# Patient Record
Sex: MALE | Race: BLACK OR AFRICAN AMERICAN | Employment: UNEMPLOYED | URBAN - METROPOLITAN AREA
[De-identification: names, ages, dates, MRNs, and addresses within clinical notes are randomized per-mention and may not be internally consistent; named-entity substitution may affect disease eponyms.]

---

## 2019-01-01 ENCOUNTER — ED HISTORICAL/CONVERTED ENCOUNTER (OUTPATIENT)
Dept: OTHER | Age: 24
End: 2019-01-01

## 2019-01-29 ENCOUNTER — ED HISTORICAL/CONVERTED ENCOUNTER (OUTPATIENT)
Dept: OTHER | Age: 24
End: 2019-01-29

## 2019-07-10 ENCOUNTER — ED HISTORICAL/CONVERTED ENCOUNTER (OUTPATIENT)
Dept: OTHER | Age: 24
End: 2019-07-10

## 2021-02-22 ENCOUNTER — HOSPITAL ENCOUNTER (INPATIENT)
Age: 26
LOS: 12 days | Discharge: HOME | DRG: 760 | End: 2021-03-06
Attending: EMERGENCY MEDICINE | Admitting: PSYCHIATRY & NEUROLOGY
Payer: COMMERCIAL

## 2021-02-22 DIAGNOSIS — E87.6 HYPOKALEMIA: ICD-10-CM

## 2021-02-22 DIAGNOSIS — F12.90 MARIJUANA USE: ICD-10-CM

## 2021-02-22 DIAGNOSIS — F29 PSYCHOSIS, UNSPECIFIED PSYCHOSIS TYPE (HCC): Primary | ICD-10-CM

## 2021-02-22 PROBLEM — F30.9 MANIA (HCC): Status: ACTIVE | Noted: 2021-02-22

## 2021-02-22 LAB
ALBUMIN SERPL-MCNC: 4.3 G/DL (ref 3.5–5)
ALBUMIN/GLOB SERPL: 1.1 {RATIO} (ref 1.1–2.2)
ALP SERPL-CCNC: 59 U/L (ref 45–117)
ALT SERPL-CCNC: 33 U/L (ref 12–78)
AMPHET UR QL SCN: NEGATIVE
ANION GAP SERPL CALC-SCNC: 7 MMOL/L (ref 5–15)
APPEARANCE UR: CLEAR
AST SERPL W P-5'-P-CCNC: 17 U/L (ref 15–37)
BACTERIA URNS QL MICRO: NEGATIVE /HPF
BARBITURATES UR QL SCN: NEGATIVE
BASOPHILS # BLD: 0 K/UL (ref 0–0.1)
BASOPHILS NFR BLD: 0 % (ref 0–1)
BENZODIAZ UR QL: NEGATIVE
BILIRUB SERPL-MCNC: 0.7 MG/DL (ref 0.2–1)
BILIRUB UR QL: NEGATIVE
BUN SERPL-MCNC: 9 MG/DL (ref 6–20)
BUN/CREAT SERPL: 7 (ref 12–20)
CA-I BLD-MCNC: 8.8 MG/DL (ref 8.5–10.1)
CANNABINOIDS UR QL SCN: POSITIVE
CHLORIDE SERPL-SCNC: 106 MMOL/L (ref 97–108)
CO2 SERPL-SCNC: 25 MMOL/L (ref 21–32)
COCAINE UR QL SCN: NEGATIVE
COLOR UR: ABNORMAL
CREAT SERPL-MCNC: 1.27 MG/DL (ref 0.7–1.3)
DIFFERENTIAL METHOD BLD: NORMAL
DRUG SCRN COMMENT,DRGCM: ABNORMAL
EOSINOPHIL # BLD: 0.2 K/UL (ref 0–0.4)
EOSINOPHIL NFR BLD: 2 % (ref 0–7)
ERYTHROCYTE [DISTWIDTH] IN BLOOD BY AUTOMATED COUNT: 13.4 % (ref 11.5–14.5)
ETHANOL SERPL-MCNC: <4 MG/DL
GLOBULIN SER CALC-MCNC: 3.9 G/DL (ref 2–4)
GLUCOSE BLD STRIP.AUTO-MCNC: 86 MG/DL (ref 65–100)
GLUCOSE SERPL-MCNC: 182 MG/DL (ref 65–100)
GLUCOSE UR STRIP.AUTO-MCNC: NEGATIVE MG/DL
HCT VFR BLD AUTO: 43.9 % (ref 36.6–50.3)
HGB BLD-MCNC: 14.8 G/DL (ref 12.1–17)
HGB UR QL STRIP: NEGATIVE
IMM GRANULOCYTES # BLD AUTO: 0 K/UL (ref 0–0.04)
IMM GRANULOCYTES NFR BLD AUTO: 0 % (ref 0–0.5)
KETONES UR QL STRIP.AUTO: NEGATIVE MG/DL
LEUKOCYTE ESTERASE UR QL STRIP.AUTO: NEGATIVE
LYMPHOCYTES # BLD: 2.2 K/UL (ref 0.8–3.5)
LYMPHOCYTES NFR BLD: 24 % (ref 12–49)
MCH RBC QN AUTO: 29.1 PG (ref 26–34)
MCHC RBC AUTO-ENTMCNC: 33.7 G/DL (ref 30–36.5)
MCV RBC AUTO: 86.4 FL (ref 80–99)
METHADONE UR QL: NEGATIVE
MONOCYTES # BLD: 0.7 K/UL (ref 0–1)
MONOCYTES NFR BLD: 8 % (ref 5–13)
MUCOUS THREADS URNS QL MICRO: ABNORMAL /LPF
NEUTS SEG # BLD: 6.2 K/UL (ref 1.8–8)
NEUTS SEG NFR BLD: 66 % (ref 32–75)
NITRITE UR QL STRIP.AUTO: NEGATIVE
OPIATES UR QL: NEGATIVE
PCP UR QL: NEGATIVE
PERFORMED BY, TECHID: NORMAL
PH UR STRIP: 5 [PH] (ref 5–8)
PLATELET # BLD AUTO: 196 K/UL (ref 150–400)
PMV BLD AUTO: 11.7 FL (ref 8.9–12.9)
POTASSIUM SERPL-SCNC: 2.7 MMOL/L (ref 3.5–5.1)
PROT SERPL-MCNC: 8.2 G/DL (ref 6.4–8.2)
PROT UR STRIP-MCNC: 30 MG/DL
RBC # BLD AUTO: 5.08 M/UL (ref 4.1–5.7)
RBC #/AREA URNS HPF: ABNORMAL /HPF (ref 0–5)
SARS-COV-2, COV2: NORMAL
SARS-COV-2, COV2: NOT DETECTED
SODIUM SERPL-SCNC: 138 MMOL/L (ref 136–145)
SP GR UR REFRACTOMETRY: 1.02 (ref 1–1.03)
UROBILINOGEN UR QL STRIP.AUTO: 0.1 EU/DL (ref 0.1–1)
WBC # BLD AUTO: 9.3 K/UL (ref 4.1–11.1)
WBC URNS QL MICRO: ABNORMAL /HPF (ref 0–4)

## 2021-02-22 PROCEDURE — 36415 COLL VENOUS BLD VENIPUNCTURE: CPT

## 2021-02-22 PROCEDURE — 82962 GLUCOSE BLOOD TEST: CPT

## 2021-02-22 PROCEDURE — 74011250637 HC RX REV CODE- 250/637: Performed by: EMERGENCY MEDICINE

## 2021-02-22 PROCEDURE — 82077 ASSAY SPEC XCP UR&BREATH IA: CPT

## 2021-02-22 PROCEDURE — 65220000003 HC RM SEMIPRIVATE PSYCH

## 2021-02-22 PROCEDURE — 96372 THER/PROPH/DIAG INJ SC/IM: CPT

## 2021-02-22 PROCEDURE — 99285 EMERGENCY DEPT VISIT HI MDM: CPT

## 2021-02-22 PROCEDURE — 80307 DRUG TEST PRSMV CHEM ANLYZR: CPT

## 2021-02-22 PROCEDURE — 80053 COMPREHEN METABOLIC PANEL: CPT

## 2021-02-22 PROCEDURE — 81001 URINALYSIS AUTO W/SCOPE: CPT

## 2021-02-22 PROCEDURE — 85025 COMPLETE CBC W/AUTO DIFF WBC: CPT

## 2021-02-22 PROCEDURE — 87635 SARS-COV-2 COVID-19 AMP PRB: CPT

## 2021-02-22 PROCEDURE — 74011250636 HC RX REV CODE- 250/636: Performed by: EMERGENCY MEDICINE

## 2021-02-22 RX ORDER — TRAZODONE HYDROCHLORIDE 50 MG/1
50 TABLET ORAL
Status: DISCONTINUED | OUTPATIENT
Start: 2021-02-22 | End: 2021-03-06 | Stop reason: HOSPADM

## 2021-02-22 RX ORDER — HYDROXYZINE PAMOATE 50 MG/1
50 CAPSULE ORAL
Status: DISCONTINUED | OUTPATIENT
Start: 2021-02-22 | End: 2021-03-06 | Stop reason: HOSPADM

## 2021-02-22 RX ORDER — ENOXAPARIN SODIUM 100 MG/ML
30 INJECTION SUBCUTANEOUS EVERY 12 HOURS
Status: DISCONTINUED | OUTPATIENT
Start: 2021-02-22 | End: 2021-02-26

## 2021-02-22 RX ORDER — ACETAMINOPHEN 650 MG/1
650 SUPPOSITORY RECTAL
Status: DISCONTINUED | OUTPATIENT
Start: 2021-02-22 | End: 2021-03-06 | Stop reason: HOSPADM

## 2021-02-22 RX ORDER — ACETAMINOPHEN 325 MG/1
650 TABLET ORAL
Status: DISCONTINUED | OUTPATIENT
Start: 2021-02-22 | End: 2021-03-06 | Stop reason: HOSPADM

## 2021-02-22 RX ORDER — ZIPRASIDONE MESYLATE 20 MG/ML
INJECTION, POWDER, LYOPHILIZED, FOR SOLUTION INTRAMUSCULAR
Status: DISPENSED
Start: 2021-02-22 | End: 2021-02-22

## 2021-02-22 RX ORDER — ZIPRASIDONE MESYLATE 20 MG/ML
20 INJECTION, POWDER, LYOPHILIZED, FOR SOLUTION INTRAMUSCULAR
Status: ACTIVE | OUTPATIENT
Start: 2021-02-22 | End: 2021-02-22

## 2021-02-22 RX ORDER — QUETIAPINE FUMARATE 100 MG/1
100 TABLET, FILM COATED ORAL 3 TIMES DAILY
Status: DISCONTINUED | OUTPATIENT
Start: 2021-02-22 | End: 2021-03-01

## 2021-02-22 RX ORDER — POTASSIUM CHLORIDE 750 MG/1
40 TABLET, FILM COATED, EXTENDED RELEASE ORAL
Status: COMPLETED | OUTPATIENT
Start: 2021-02-22 | End: 2021-02-22

## 2021-02-22 RX ADMIN — POTASSIUM CHLORIDE 40 MEQ: 750 TABLET, FILM COATED, EXTENDED RELEASE ORAL at 04:01

## 2021-02-22 RX ADMIN — ENOXAPARIN SODIUM 30 MG: 100 INJECTION SUBCUTANEOUS at 06:33
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
## 2021-02-22 NOTE — BSMART NOTE
Pt accepted to 2S by Manny Mcclure pending available acute bed. Pt to be transferred once bed is ready.

## 2021-02-22 NOTE — ROUTINE PROCESS
TRANSFER - OUT REPORT: 
 
Verbal report given to Jacqui(name) on Arian Calderon  being transferred to (unit) for routine progression of care Report consisted of patients Situation, Background, Assessment and  
Recommendations(SBAR). Information from the following report(s) SBAR was reviewed with the receiving nurse. Lines:    
 
Opportunity for questions and clarification was provided. Patient transported with: 
 Pricila Aquino
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
## 2021-02-23 PROCEDURE — 65220000003 HC RM SEMIPRIVATE PSYCH

## 2021-02-23 RX ORDER — OLANZAPINE 5 MG/1
5 TABLET ORAL
Status: DISCONTINUED | OUTPATIENT
Start: 2021-02-23 | End: 2021-03-06 | Stop reason: HOSPADM

## 2021-02-23 RX ORDER — HALOPERIDOL 5 MG/ML
5 INJECTION INTRAMUSCULAR
Status: DISCONTINUED | OUTPATIENT
Start: 2021-02-23 | End: 2021-03-06 | Stop reason: HOSPADM

## 2021-02-23 RX ORDER — ADHESIVE BANDAGE
30 BANDAGE TOPICAL DAILY PRN
Status: DISCONTINUED | OUTPATIENT
Start: 2021-02-23 | End: 2021-03-06 | Stop reason: HOSPADM

## 2021-02-23 RX ORDER — TRAZODONE HYDROCHLORIDE 50 MG/1
50 TABLET ORAL
Status: DISCONTINUED | OUTPATIENT
Start: 2021-02-23 | End: 2021-03-06 | Stop reason: HOSPADM

## 2021-02-23 RX ORDER — HYDROXYZINE 50 MG/1
50 TABLET, FILM COATED ORAL
Status: DISCONTINUED | OUTPATIENT
Start: 2021-02-23 | End: 2021-03-06 | Stop reason: HOSPADM

## 2021-02-23 RX ORDER — LORAZEPAM 2 MG/ML
1 INJECTION INTRAMUSCULAR
Status: DISCONTINUED | OUTPATIENT
Start: 2021-02-23 | End: 2021-03-06 | Stop reason: HOSPADM

## 2021-02-23 RX ORDER — ACETAMINOPHEN 325 MG/1
650 TABLET ORAL
Status: DISCONTINUED | OUTPATIENT
Start: 2021-02-23 | End: 2021-03-06 | Stop reason: HOSPADM

## 2021-02-23 RX ORDER — BENZTROPINE MESYLATE 1 MG/1
1 TABLET ORAL
Status: DISCONTINUED | OUTPATIENT
Start: 2021-02-23 | End: 2021-03-06 | Stop reason: HOSPADM

## 2021-02-23 RX ORDER — DIPHENHYDRAMINE HYDROCHLORIDE 50 MG/ML
50 INJECTION, SOLUTION INTRAMUSCULAR; INTRAVENOUS
Status: DISCONTINUED | OUTPATIENT
Start: 2021-02-23 | End: 2021-03-06 | Stop reason: HOSPADM

## 2021-02-23 RX ORDER — POTASSIUM CHLORIDE 20 MEQ/1
40 TABLET, EXTENDED RELEASE ORAL
Status: DISPENSED | OUTPATIENT
Start: 2021-02-23 | End: 2021-02-24

## 2021-02-23 NOTE — GROUP NOTE
[facility] GROUP DOCUMENTATION INDIVIDUAL Group Therapy Note Date: 2/23/2021 Group Start Time: 0900 Group End Time: 0930 Group Topic: [employer] SRM 2 [facility] ACUTE Chris Santillan RN 
 
 
 
Group Therapy Note CLIENT INVITED TO  ATTEND BUT DECLINED Attend 3 Attendance: {[facility] GROUP DOC ATTENDANCE:08845} Patient's Goal:  *** Interventions/techniques: {[facility] GROUP DOC INTERVENTIONS/TECHNIQUES:18209} Follows Directions: {[facility] GROUP DOC FOLLOWS DIRECTION:45751} Interactions: {[facility] GROUP DOC INTERACTIONS:86583} Mental Status: {[facility] MENTAL FNZNID:34971} Behavior/appearance: {[facility] GROUP DOC BEHAVIOR/APPEARANCE:14167} Goals Achieved: {[facility] GROUP DOC GOALS ACHIEVED:34593} Additional Notes:  *** 
 
Samantha Lawler RN

## 2021-02-23 NOTE — GROUP NOTE
IP  GROUP DOCUMENTATION INDIVIDUAL Group Therapy Note Date: 2/23/2021 Group Start Time: 4181 Group End Time: 5796 Group Topic: Special Track - Drug Topic SRM 2 [facility] ACUTE Maria E Ohs, RT 
 
[facility] GROUP DOCUMENTATION GROUP Group Therapy Note [employer] Attendees: 4 Attendance: Did not attend Additional Notes:  Pt was invited to attend group but did not attend.  
 
Adalberto Gomez, RT

## 2021-02-23 NOTE — GROUP NOTE
[facility] GROUP DOCUMENTATION INDIVIDUAL Group Therapy Note Date: 2/23/2021 Group Start Time: 1300 Group End Time: 0644 Group Topic: Process Group - Inpatient SRM CARE MANAGEMENT Chan Chavez [facility] GROUP DOCUMENTATION GROUP Group Therapy Note Attendees: 4/10 Pts participated in group therapy. Pts answered check in question about what led to admission and how they are feeling today. Pt's then discussed substance use, support system and other mental health related topics. Pts were encouraged to share and to give feedback to peers. Attendance: Did not attend [facility]

## 2021-02-23 NOTE — BH NOTES
RELEASE OF INFORMATION    The patient signed a consent form for his mother Guillermo Max ([phone]), and his father Otoniel Estrella ([phone]) to be contacted.

## 2021-02-23 NOTE — BH NOTES
Patient is assessed being in bed all day. Patient was encouraged several times by staff to get out of bed and participate in treatment. Patient refused breakfast and lunch but he did get out of bed for dinner. Patient refused all medications from this writer. Patient stated that the ED lied on him. Patient was provided support. Patient has a flat affect, and poor eye contact. Patient was not noted to have good hygiene throughout the day.

## 2021-02-23 NOTE — PROGRESS NOTES
Problem: Klaudia/Hypomania  Goal: *LTG: Reduce agitation, impulsivity, and pressured speech  Description: Reduce agitation, impulsivity, and pressured speech while achieving sensitivity to the consequences of behavior and having more realistic expectations. Outcome: Progressing Towards Goal     Problem: Klaudia/Hypomania  Goal: *STG: Be less agitated and distracted  Description: Be less agitated and distracted, e.g. be able to sit quietly and calmly for 60 minutes  Outcome: Progressing Towards Goal     Problem: Chemical Dependency (Adult/Pediatric)  Goal: *STG: Complies with medication therapy  Outcome: Not Progressing Towards Goal     Problem: Chemical Dependency (Adult/Pediatric)  Goal: *STG: Attends activities and groups  Outcome: Not Progressing Towards Goal     No agitation, impulsivity exhibited this shift. No agitation or distraction noted this shift. Patient declined medication. No groups this shift. Patient has been in bed all shift. Patient was engaged in a therapeutic conversation and he minimally responded. He was polite. Patient declined any needs. He declined to take his HS Seroquel. He declined snack. Patient has not exhibited any negative behaviors this shift. Continue to assess.

## 2021-02-23 NOTE — GROUP NOTE
IP  GROUP DOCUMENTATION INDIVIDUAL Group Therapy Note Date: 2/23/2021 Group Start Time: 1400 Group End Time: 1500 Group Topic: Special Track - Drug Topic SRM 2 [facility]UTE Noel Homans, RT 
 
[facility] GROUP DOCUMENTATION GROUP Group Therapy Note Stages of Change Attendees: 6 Attendance: Did not attend Additional Notes:  Pt was invited to attend group but did not Gearld Aida RT
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
## 2021-02-23 NOTE — BH NOTES
Recreational Therapy Assessment    Pt was approached to complete RT assessment but pt did not respond verbally when name was called a few times. Pt was laying in bed with covers pulled over his head.  Will re-attempt to assess at a later time
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
## 2021-02-23 NOTE — BH NOTES
Patient Unable to Complete Assessment Refused Assessment. Pt approached to complete RT Assessment but related he did not want to complete today. Encouraged to complete tomorrow.

## 2021-02-24 PROCEDURE — 65220000003 HC RM SEMIPRIVATE PSYCH

## 2021-02-24 PROCEDURE — 74011250636 HC RX REV CODE- 250/636: Performed by: PSYCHIATRY & NEUROLOGY

## 2021-02-24 PROCEDURE — 74011250637 HC RX REV CODE- 250/637: Performed by: PSYCHIATRY & NEUROLOGY

## 2021-02-24 PROCEDURE — 74011000250 HC RX REV CODE- 250: Performed by: PSYCHIATRY & NEUROLOGY

## 2021-02-24 RX ORDER — HALOPERIDOL 5 MG/ML
5 INJECTION INTRAMUSCULAR 2 TIMES DAILY
Status: DISCONTINUED | OUTPATIENT
Start: 2021-02-24 | End: 2021-03-01

## 2021-02-24 RX ADMIN — QUETIAPINE FUMARATE 100 MG: 100 TABLET ORAL at 16:53

## 2021-02-24 RX ADMIN — ZIPRASIDONE MESYLATE 20 MG: 20 INJECTION, POWDER, LYOPHILIZED, FOR SOLUTION INTRAMUSCULAR at 02:55

## 2021-02-24 RX ADMIN — QUETIAPINE FUMARATE 100 MG: 100 TABLET ORAL at 21:06

## 2021-02-24 NOTE — PROGRESS NOTES
Problem: Klaudia/Hypomania  Goal: *LTG: Reduce psychic energy and return to normal activity levels, good judgement, stable mood, and goal-directed behavior  Outcome: Not Progressing Towards Goal     Problem: Klaudia/Hypomania  Goal: *STG: Decrease impulsivity in action  Description: Decrease impulsivity in action, e.g. refrain from engaging in self-destructive behaviors such as over-spending, promiscuity, substance abuse, or use of profane language. Outcome: Not Progressing Towards Goal     Problem: Klaudia/Hypomania  Goal: *STG: Achieve mood stability  Description: Achieve mood stability, e.g. slower to react with anger and less expansive or elevated. Outcome: Not Progressing Towards Goal     Problem: Chemical Dependency (Adult/Pediatric)  Goal: *STG: Remains safe in hospital  Outcome: Progressing Towards Goal     Problem: Chemical Dependency (Adult/Pediatric)  Goal: *STG: Attends activities and groups  Outcome: Not Progressing Towards Goal     Patient has been pacing on the unit. Patient has been anxiously pacing the unit.   Patient has been safe in the hospital.
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
## 2021-02-24 NOTE — GROUP NOTE
[facility] GROUP DOCUMENTATION INDIVIDUAL Group Therapy Note Date: 2/24/2021 Group Start Time: 1100 Group End Time: 1200 Group Topic: Education Group - Inpatient [facility] Julissa Davis R 
 
[facility] GROUP DOCUMENTATION GROUP Group Therapy Note Attendees: Patients completed safety planning worksheet. Patients encouraged to think about warning signs they may be going into a crisis, different coping strategies, people they can reach out to for a distraction, people they can call for help, and ways to increase their safety when at their homes. Patients encouraged to share openly and support each other in coming up with responses for the worksheet. Attendance: Did not attend Patient's Goal:  n/a Interventions/techniques: n/a Follows Directions: n/a Interactions: n/a Mental Status: n/a Behavior/appearance: n/a 
 
Goals Achieved: n/a Additional Notes:  Pt encouraged but sis not attend group. Veronica Carrington

## 2021-02-24 NOTE — GROUP NOTE
[facility] GROUP DOCUMENTATION INDIVIDUAL Group Therapy Note Date: 2/24/2021 Group Start Time: 1300 Group End Time: 1922 Group Topic: Process Group - Inpatient [facility] Group Jimenez [facility] GROUP DOCUMENTATION GROUP Group Therapy Note Attendees: 6/9 Process: Pts were asked what they want to achieve while at [facility]. Pts were encouraged to share what brought them to the hospital and spoke a lot about anger and impulsive decisions. Pts were encouraged to provide feedback to peers. Writer facilitated a breathing exercise that pts could participate in if they chose to do so. Pts were then asked to reflect on group Attendance: Did not attend Additional Notes:  Pt encouraged briefly for a few minutes and then left group. Pt said he is 'trying to get through day by day' [employer]

## 2021-02-24 NOTE — GROUP NOTE
[facility]  GROUP DOCUMENTATION INDIVIDUAL Group Therapy Note Date: 2/24/2021 Group Start Time: 4738 Group End Time: 4734 Group Topic: [employer] [facility] ACUTE [place] [facility]  GROUP DOCUMENTATION GROUP Group Therapy Note Attendees: 3 Attendance: Did not attend Patient's Goal: Interventions/techniques: Follows Directions:  
 
Interactions:  
 
Mental Status:  
 
Behavior/appearance:  
 
Goals Achieved: Additional Notes:  Patient was offered the opportunity to attend but did not. Devon Morley

## 2021-02-24 NOTE — GROUP NOTE
IP  GROUP DOCUMENTATION INDIVIDUAL Group Therapy Note Date: 2/24/2021 Group Start Time: 1400 Group End Time: 1500 Group Topic: Special Track - Drug Topic [facility]UTE Titus Bryan, RT 
 
[facility] GROUP DOCUMENTATION GROUP Group Therapy Note Recovery & Relapse Attendees: 6 Attendance: Attended Patient's Goal:  To attend groups Interventions/techniques: Informed Follows Directions: Followed directions Interactions: Interacted appropriately Mental Status: Calm Behavior/appearance: Attentive and Cooperative Goals Achieved: Able to engage in interactions, Able to listen to others, Able to reflect/comment on own behavior and Able to receive feedback Additional Notes:  Pt attended group for the first time and was engaged.  
 
Earl Cornelius, RT
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
## 2021-02-24 NOTE — GROUP NOTE
[facility]  GROUP DOCUMENTATION INDIVIDUAL Group Therapy Note Date: 2/24/2021 Group Start Time: 1949 Group End Time: 8437 Group Topic: Special Track - Drug Topic [facility] ACUTE Rodrigo Baumann, RT 
 
[facility] GROUP DOCUMENTATION GROUP Group Therapy Note Feelings Attendees: 6 Attendance: Attended Patient's Goal:  To attend groups Interventions/techniques: Informed Follows Directions: Followed directions Interactions: Interacted appropriately Mental Status: Calm Behavior/appearance: Attentive and Cooperative Goals Achieved: Able to listen to others Additional Notes:  Pt attended group and was engaged in discussion of group topic.  
 
Hanna Duncan, RT

## 2021-02-24 NOTE — BH NOTES
Pt.has been in bed most of shift resting quietly. affect is flat, appetite fair, appearance is disheveled, denies feeling suicidal,appears anxious,fearful at times. parents called frequently to check on pt. No agitation seen thus far,no concerns voiced. remains on close observation.

## 2021-02-24 NOTE — BH NOTES
TDO & RAMY    Pt was committed for 10 days on 2-24-21 by Radha Guerin. He was represented by [employer]. Pt was also given an order to treat. Documentation was placed in pt's chart.

## 2021-02-25 PROCEDURE — 74011250636 HC RX REV CODE- 250/636: Performed by: PSYCHIATRY & NEUROLOGY

## 2021-02-25 PROCEDURE — 65220000003 HC RM SEMIPRIVATE PSYCH

## 2021-02-25 PROCEDURE — 74011250637 HC RX REV CODE- 250/637: Performed by: PSYCHIATRY & NEUROLOGY

## 2021-02-25 RX ADMIN — QUETIAPINE FUMARATE 100 MG: 100 TABLET ORAL at 17:20

## 2021-02-25 RX ADMIN — QUETIAPINE FUMARATE 100 MG: 100 TABLET ORAL at 21:23

## 2021-02-25 RX ADMIN — QUETIAPINE FUMARATE 100 MG: 100 TABLET ORAL at 08:40

## 2021-02-25 RX ADMIN — HALOPERIDOL LACTATE 5 MG: 5 INJECTION, SOLUTION INTRAMUSCULAR at 08:41
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
## 2021-02-25 NOTE — BH NOTES
COLLATERAL CONTACT    The therapist spoke with the patient's mother on the phone. She was inquiring about how her son was doing, and the therapist provided her with an update. The therapist explained that he was committed up to 10 days and that he has an order to treat for his medications because he had been refusing them. She said that she was not sure of any history of mental health in the family other than the patient's cousin on his dad's side who has been hospitalized before. She said that when the patient was previously hospitalized he was diagnosed with substance induced psychosis, and wasn't given any medications. She believes that he started smoking weed in high school. She said that he has seen more depressed recently, due to isolating himself, and not taking care of his ADL's including hygiene. She said that he can become so paranoid and delusional that there is not talking to him. She also confirmed that his son Anya Schilling is safe, and has a good life. She said that him and the mother of his children broke up officially around 6 months to a year ago. She said that they had been off and on their entire relationship. She said that at his baseline he is happy, and likes to joke around. 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 She said that he has not been in 3 years though. She added that he requested to see the  at one point, although he never showed up which had upset him.

## 2021-02-25 NOTE — BH NOTES
Dx: Psychosis    Affect restricted. Remains suspicious/paranoid. Verbalized he is unable to take a shower, because \"things get on me. \" No interactions with peers; minimal with staff. Reluctant to take prescribed meds; cooperative with explanations/encouragement. Consumed 100% of bfkt, and returned to bed. Appearance disheveled; encouraged to shower and change his gowns. Encouraged to attend groups. Q 15 mins checks maintained, for safety. Remained in bed, the majority of the shift. Refused to attend groups, although encouraged. Did not get up for lunch. No attempts to self harm. Pt did not shower and/or change his gowns, although encouraged.

## 2021-02-25 NOTE — GROUP NOTE
[facility] GROUP DOCUMENTATION INDIVIDUAL Group Therapy Note Date: 2/25/2021 Group Start Time: 1300 Group End Time: 1400 Group Topic: Process Group - Inpatient [facility] Group [facility] [place] [facility] GROUP DOCUMENTATION GROUP Group Therapy Note Attendees: 3 out of 11 participants engaged in a process group that discussed the patients planning guide. The patients identified with their symptoms and signals that triggered stress in their life. Patients were able to define their problems from several perspectives to process their source of stress. Patients discussed and processed coping strategies they have tried to support their needs and acknowledged what worked during and what did not work during their problem-solving attempts. Lastly Patients reflected on their capability to deal with their problems and their motivation to change. Attendance: Did not attend Patient's Goal:  Patient did not attend group session. Interventions/techniques: N/A Follows Directions: N/A Interactions: N/A Mental Status:N/A Behavior/appearance: N/A Goals Achieved: N/A Additional Notes:  Patient was informed of group session and provided topic information that would be discussed during the session. Patient decline to participate however the patient was provided literature, worksheets and informed on the process of completion. Patient was encouraged to complete worksheets during leisure to increase coping skills and ability to process how to problem-solve to prevent risk of hospitalization. Paolo Wiggins

## 2021-02-25 NOTE — PROGRESS NOTES
Client presents alert and oriented to person and place, cooperative, calm and flat and will engage in conversation when approached by staff. Denies SI/HI/AVH with no delusional conversation content and does not appear to be responding to internal stimuli at this time. Did not accept snack. Accepted PO medication.
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
## 2021-02-25 NOTE — BH NOTES
Pt Initial Assessment    Writer met with pt on this date for  Peer assessment. Pt currently expressed substance use of marijuana. Pt expressed using marijuana daily but was not able to say how much he smoked each day. Pt expressed that he felt his marijuana use was not serious and that he could manage it on his own. Pt reported feeling depressed and stated, \"I don't belong here, I'm not like those other people. \" Pt expressed feeling fatigued, insomnia, and anxiety but denies feeling any other detox symptoms. Pt was informed about the SA groups and encouraged to attend. Pt appears to be in the pre contemplation stage of change.

## 2021-02-25 NOTE — BH NOTES
Behavioral Health Treatment Team Note     Patient goal(s) for today: \"To try to make it through to get discharged\"  Treatment team focus/goals: To continue medication management, group therapy, and gather collateral.     Progress note: Pt presented as lying in bed with the side of his face on his pillow. Pt presented with blunted affect and reported feeling \"okay\". Pt reported he did not feel drowsy but wanted to \"rest\". Pt denied SI/HI, AH/VH. Pt reported he has not tried to call any of his family members and has not decided if he will at this point. An inpatient level of care is still required in order to be provided medication management, group therapy, and discharge planning assistance. LOS:  3  Expected LOS: 5-7 days    Insurance info/prescription coverage:  Self-pay  Date of last family contact:  2/24/21  Family requesting physician contact today:  No  Discharge plan: The therapist will continue to explore options with the patient.    Guns in the home: No  Outpatient provider(s):  None currently    Participating treatment team members: Leonie Heredia LMSW, ALLY Huston Intern

## 2021-02-25 NOTE — GROUP NOTE
[facility]  GROUP DOCUMENTATION INDIVIDUAL Group Therapy Note Date: 2/25/2021 Group Start Time: 8353 Group End Time: 5200 Group Topic: [employer] SRM 2 BH NON ACUTE Marmaduke Merl [facility] GROUP DOCUMENTATION GROUP Group Therapy Note Attendees:5 Attendance: Did not attend Patient's Goal: Interventions/techniques: Follows Directions:  
 
Interactions:  
 
Mental Status:  
 
Behavior/appearance:  
 
Goals Achieved: Additional Notes:  Pt was encouraged to attend group but refused. [facility] [place]

## 2021-02-25 NOTE — BH NOTES
SRM Recreational Therapy Assessment    Orientation:  Person    Reason for Admission:  PT unable to complete assessment at this time. Assessment completed from the chart. PT chart indicated he was brought to ER by police under ECO. Pt father called police due to increased erratic behaviors. PT running around in the street wearing shorts and no shoes. Pt also has demonstrated on admission thought blocking, preoccupations, paranoia, Slow response and responding to AVH. PT using marijuana daily. Has had previous mental health treatment in the past.      Medical Precautions / Conditions:  did not identify    Impairments:     Vision:  Wears Glasses unable to identify      Wears Contacts unable to identify      Are they with Patient unable to identify     Hearing Aids did not identify     Utilization of Ambulatory Devices:  did not identify    Health Problems Preventing Participation in Activities:  Unable to Answer  How:  n/a  Leisure Interest Checklist:  did not identify    Does patient participate in leisure activities:  Unable to Answer    Setting:  Alone    Other Activities / Skills / Talents: n/a  Do emotions interfere with leisure activities / lifestyles:  Unable to Answer    When engaging in leisure activities, do you forget worries:  Unable to Answer    Do you belong to a Congregation:  Unable to Answer    Are you active in [employer] activities:  Unable to Answer    Typical Day:  Did not identify   Strengths:  Family Support, Housing and lives with father and mother and father are supportive    Limitations / Barriers:  Substance Abuse, Hallucinations, Paranoia and poor insight and judgement.     Treatment Modalities:  Stress Management, Coping Skills, Symptom Recognition, Healthy Thinking, Mood Management and Leisure Skills    Patient Educational  Needs:  Skills to recognize and challenge problematic thinking, Identify positive coping strategies and skills to manage symptoms or moods, Leisure education and Recognition of symptoms and signs    Focus of Treatment:  Introduce positive outlets for self expression and Introduce and encourage the exploration of alternative coping skills    Summary: PT unable to complete assessment at this time. Assessment completed from the chart. PT chart indicated he was brought to ER by police under ECO. Pt father called police due to increased erratic behaviors. PT running around in the street wearing shorts and no shoes. Pt also has demonstrated on admission thought blocking, preoccupations, paranoia, Slow response and responding to AVH. PT using marijuana daily. Has had previous mental health treatment in the past.

## 2021-02-25 NOTE — GROUP NOTE
IP  GROUP DOCUMENTATION INDIVIDUAL Group Therapy Note Date: 2/25/2021 Group Start Time: 6510 Group End Time: 4814 Group Topic: Special Track - Drug Topic SRM 2 BEHA HLTH ACUTE Skeet Parrott, RT 
 
[facility] GROUP DOCUMENTATION GROUP Group Therapy Note [facility] Attendees: 3 Attendance: Did not attend Additional Notes: Pt was invited to attend group but did not attend.  
 
Jose Luis Cat, RT

## 2021-02-25 NOTE — GROUP NOTE
IP  GROUP DOCUMENTATION INDIVIDUAL Group Therapy Note Date: 2/25/2021 Group Start Time: 1400 Group End Time: 1500 Group Topic: Special Track - Drug Topic SRM 2 BEHA HLTH ACUTE Manda Beach, RT 
 
[facility] GROUP DOCUMENTATION GROUP Group Therapy Note Protracted Withdrawal  
 
Attendees: 4 Attendance: Did not attend Additional Notes: Pt was invited to attend group but did not attend.  
 
Maritza Blanchard, RT

## 2021-02-25 NOTE — GROUP NOTE
[facility] GROUP DOCUMENTATION INDIVIDUAL Group Therapy Note Date: 2/25/2021 Group Start Time: 46 Group End Time: 1000 Group Topic: Nursing SRM 2 BEHA [facility] ACUTE Vanessa Hoang RN 
 
[facility] GROUP DOCUMENTATION GROUP Group Therapy Note Attendees: 5 Attendance: Did not attend Patient's Goal: Interventions/techniques: Follows Directions:  
 
Interactions:  
 
Mental Status:  
 
Behavior/appearance:  
 
Goals Achieved: Additional Notes:  Patient encouraged to attend the group, but patient refused and did not attend. Isolated to his room.   
 
Lee Cosby RN

## 2021-02-25 NOTE — PROGRESS NOTES
Problem: Klaudia/Hypomania  Goal: *LTG: Reduce agitation, impulsivity, and pressured speech  Description: Reduce agitation, impulsivity, and pressured speech while achieving sensitivity to the consequences of behavior and having more realistic expectations. Outcome: Progressing Towards Goal  Goal: *STG: Decrease impulsivity in action  Description: Decrease impulsivity in action, e.g. refrain from engaging in self-destructive behaviors such as over-spending, promiscuity, substance abuse, or use of profane language. Outcome: Progressing Towards Goal  Goal: *STG: Achieve mood stability  Description: Achieve mood stability, e.g. slower to react with anger and less expansive or elevated.   Outcome: Progressing Towards Goal     Problem: Chemical Dependency (Adult/Pediatric)  Goal: *STG: Remains safe in hospital  Outcome: Progressing Towards Goal  Goal: *STG: Complies with medication therapy  Outcome: Progressing Towards Goal

## 2021-02-25 NOTE — GROUP NOTE
[facility] GROUP DOCUMENTATION INDIVIDUAL Group Therapy Note Date: 2/25/2021 Group Start Time: 1100 Group End Time: 7798 Group Topic: Education Group - Inpatient [facility] Cathy Rodriguez [facility] GROUP DOCUMENTATION GROUP Group Therapy Note Attendees: 6/11 Psych-Ed: Pts were asked to share something that they are proud of themselves of as a check in question. Pts were then walked through the triggers and feelings worksheet and were encouraged to share their thoughts and provide feedback to peers. Pts were then walked through a breathing exercise before reflecting on group Attendance: Did not attend Additional Notes:  Pt was encouraged to attend but chose not to do so [facility]

## 2021-02-26 PROCEDURE — 65220000003 HC RM SEMIPRIVATE PSYCH

## 2021-02-26 PROCEDURE — 74011250636 HC RX REV CODE- 250/636: Performed by: PSYCHIATRY & NEUROLOGY

## 2021-02-26 PROCEDURE — 74011250637 HC RX REV CODE- 250/637: Performed by: PSYCHIATRY & NEUROLOGY

## 2021-02-26 RX ADMIN — HALOPERIDOL LACTATE 5 MG: 5 INJECTION, SOLUTION INTRAMUSCULAR at 09:06

## 2021-02-26 RX ADMIN — QUETIAPINE FUMARATE 100 MG: 100 TABLET ORAL at 09:05

## 2021-02-26 RX ADMIN — QUETIAPINE FUMARATE 100 MG: 100 TABLET ORAL at 17:43

## 2021-02-26 RX ADMIN — QUETIAPINE FUMARATE 100 MG: 100 TABLET ORAL at 21:09

## 2021-02-26 NOTE — GROUP NOTE
IP  GROUP DOCUMENTATION INDIVIDUAL Group Therapy Note Date: 2/26/2021 Group Start Time: 1400 Group End Time: 1500 Group Topic: Special Track - Drug Topic SRM 2 BEHA [facility] ACUTE Manda Beach, RT 
 
[facility] GROUP DOCUMENTATION GROUP Group Therapy Note Disease of Addiction Attendees: 5 Attendance: Did not attend Additional Notes:  Pt was invited to attend group but did not attend Maritza Blanchard RT

## 2021-02-26 NOTE — BH NOTES
Behavioral Health Treatment Team Note     Patient goal(s) for today: \"just keep on going\"  Treatment team focus/goals: To continue medication management, group therapy, and discharge planning. Progress note: The patient was presenting with a broad affect that was mostly flat. His thoughts and speech were organized. He did not appear as anxious, as he has been the previous days. He denied current SI/HI, or AH/VH's. The therapist spoke to him about the conversations that she has had with both of his parents, who say that she talks to himself outside of the hospital. He was still denying hearing voices outside of the hospital as well. The therapist questioned him about how he felt about his son now. He said that he feels better about his son, and that he is probably in a good environment. The therapist also reiterated this, by sharing that both of his parents said that he is safe and is doing well. An inpatient level of care is still required because the patient just begun taking psychotropic medications, and has very little insight, which would risk a relapse from occurring soon if he were to be discharged now. LOS:  4  Expected LOS: Committed up to 10 days on 2/24/21    Insurance info/prescription coverage:  Self-pay, medicaid pending  Date of last family contact:  2/25/21  Family requesting physician contact today:  no  Discharge plan: The therapist will explore options with the patient. Guns in the home:  no   Outpatient provider(s):  None currently.      Participating treatment team members: Eufemia Scruggs LMSW

## 2021-02-26 NOTE — BH NOTES
Patient sleeping but easily aroused. Isolative, declined breakfast, encourage to take fluids. Took medications as ordered. Denies SI/HI/AH/VH. Remains in bed.

## 2021-02-26 NOTE — BH NOTES
Spoke with Dr. Loly Mckenna r/t patient order for lovenox injection, new order to discontinue lovenox.

## 2021-02-26 NOTE — GROUP NOTE
IP  GROUP DOCUMENTATION INDIVIDUAL Group Therapy Note Date: 2/26/2021 Group Start Time: 3471 Group End Time: 9201 Group Topic: Special Track - Drug Topic SRM 2 BEHA TH ACUTE Mac Lei RT 
 
[facility] GROUP DOCUMENTATION GROUP Group Therapy Note Addictive Agents Attendees: 4 Attendance: Did not attend Additional Notes:  Pt was invited to attend group but did not attend Ching Dior RT

## 2021-02-26 NOTE — PROGRESS NOTES
Client presents alert and oriented to person and place, flat but conversational when approached by staff. Conversation did not contain delusional content. Denies SI/HI/AVH, depression and anxiety. Denied any concerns. Staff encouraged client and he agreed to inform them of any change in mood. Isolating to room/bed this PM but did accept multiple snacks and fluids after midnight. Accepted PO QHS medication.

## 2021-02-26 NOTE — BH NOTES
Patient case discussed in the treatment team patient staying in his room rarely getting out alert awake felt the medication making him have nausea not feeling well he is kind of wondering whether he needs that he need to take medication at.   He was also not wearing any clothing in his shirt increase to wear shirt and start getting out of his room and go to groups encouraged him to talk to his father for feedback what was going on that led to the hospitalization his insight is poor judgment is poor continued inpatient level of care indicated he is taking Seroquel 100 mg 3 times a day some hesitation on his part but not refusing it

## 2021-02-26 NOTE — GROUP NOTE
[facility] GROUP DOCUMENTATION INDIVIDUAL Group Therapy Note Date: 2/26/2021 Group Start Time: 1100 Group End Time: 1200 Group Topic: Education Group - Inpatient SRM CARE MANAGEMENT Beni Finney [facility] GROUP DOCUMENTATION GROUP Group Therapy Note Attendees: 5 out of 13 participants engaged in Psychoeducational group session. The group participated in an automatic psychological process that processed the patients symptoms, emotional plan and awareness of internal and external stressors. The patients interacted in session and provided feedback on emotional conflicts they have encountered, accepting responsibility versus conflict, how they view themselves, their thoughts and shifting their focus on critical thinking to change their circumstances. Attendance: Did not attend Patient's Goal:  Did not attend group Interventions/techniques: N/A Follows Directions:N/A Interactions: N/A Mental Status: N/A Behavior/appearance: N/A Goals Achieved: N/A Additional Notes:  Patient was encouraged to attend group session however patient declined. Patient was provided information of the topic of session and worksheets to complete during their leisure. Ramone Castellano

## 2021-02-26 NOTE — GROUP NOTE
[facility]  GROUP DOCUMENTATION INDIVIDUAL Group Therapy Note Date: 2/26/2021 Group Start Time: 1300 Group End Time: 0281 Group Topic: Process Group - Inpatient [facility] Birdie Luke IP  GROUP DOCUMENTATION GROUP Group Therapy Note Attendees: 2/13 Process: Pts were asked what a goal was for today. Pts were then asked about how they were feeling about treatment and their discharge plans. Pts were then walked through a breathing exercise before ending group due to low participation Attendance: Did not attend Additional Notes:  Pt was encouraged to attend but chose not to do so [facility]

## 2021-02-27 LAB
ATRIAL RATE: 100 BPM
CALCULATED P AXIS, ECG09: 81 DEGREES
CALCULATED R AXIS, ECG10: 58 DEGREES
CALCULATED T AXIS, ECG11: 76 DEGREES
DIAGNOSIS, 93000: NORMAL
P-R INTERVAL, ECG05: 152 MS
Q-T INTERVAL, ECG07: 328 MS
QRS DURATION, ECG06: 86 MS
QTC CALCULATION (BEZET), ECG08: 423 MS
VENTRICULAR RATE, ECG03: 100 BPM

## 2021-02-27 PROCEDURE — 93005 ELECTROCARDIOGRAM TRACING: CPT

## 2021-02-27 PROCEDURE — 74011250637 HC RX REV CODE- 250/637: Performed by: PSYCHIATRY & NEUROLOGY

## 2021-02-27 PROCEDURE — 65220000003 HC RM SEMIPRIVATE PSYCH

## 2021-02-27 PROCEDURE — 74011250636 HC RX REV CODE- 250/636: Performed by: PSYCHIATRY & NEUROLOGY

## 2021-02-27 RX ADMIN — QUETIAPINE FUMARATE 100 MG: 100 TABLET ORAL at 21:33

## 2021-02-27 RX ADMIN — QUETIAPINE FUMARATE 100 MG: 100 TABLET ORAL at 09:35

## 2021-02-27 RX ADMIN — HALOPERIDOL LACTATE 5 MG: 5 INJECTION, SOLUTION INTRAMUSCULAR at 09:35

## 2021-02-27 RX ADMIN — QUETIAPINE FUMARATE 100 MG: 100 TABLET ORAL at 16:54

## 2021-02-27 NOTE — GROUP NOTE
[facility] GROUP DOCUMENTATION INDIVIDUAL Group Therapy Note Date: 2/27/2021 Group Start Time: 1100 Group End Time: 1200 Group Topic: Process Group - Inpatient [facility] BE[facility]AVIORAL [facility] OP [facility] 
 
[facility] GROUP DOCUMENTATION GROUP Group Therapy Note Attendees: Patients encouraged to discuss the things that have been on their mind, the things that have been bothering them, the things that brought them to the hospital, how they are feeling. Patients encouraged to speak openly and honestly. Patients encouraged to support each other, to be open and honest.  Themes surrounding anxiety, judgement, and family emerged and were discussed, Attendance: Attended late, left early Patient's Goal:  Attends activities and groups Interventions/techniques: Validated, Promoted peer support, Reinforced and Supported Follows Directions: Followed directions Interactions: Interacted appropriately Mental Status: Blunted, Flat, Preoccupied and Restricted Behavior/appearance: Cooperative and Withdrawn/quiet Goals Achieved: Able to engage in interactions and Able to listen to others Additional Notes:  Pt attended group and was engaged. Pt quiet throughout group, attended about half the time, didn't talk and left early. [place]

## 2021-02-27 NOTE — BH NOTES
Dx: Psychosis    Affect restricted. No eye to eye contact. Appeared angry; refused to respond when greeted, this morning. Consumed approx 1/4 of bfkt. Pt's mother dropped off some clothes, for him, and he was receptive, when they were given to him. Required redirections for coming out of his room, inappropriately dressed. Cooperative with returning to his room and putting his gown on the correct way. Compliant with 0900 scheduled meds. No interactions with others. Encouraged to shower. Encouraged to attend groups. Q 15 mins checks maintained, for safety. Remained in bed, this shift. Refused to attend groups, although encouraged. Appetite poor; refused bfkt, and lunch. Accepted 1/2 of dinner. Cooperative with drinking additional fluids. Encouraged to get OOB some. Stated, \"I just want to go home. \" Encouraged to call his mother, who called gerardo earlier.

## 2021-02-27 NOTE — BH NOTES
Patient case discussed in the treatment team feedback from the patient's mother patient's remains very isolated not wearing pressure he says he does not have a close may need some help calling the family to bring some close patient problem mother understands some support patient taking medication but passive poor insight poor judgment somewhat immature isolated not attending the groups.   Less paranoid but guarded poor insight poor judgment he is taking medication continued inpatient level of care indicated to get the the benefit from psychotropic administration continued to be guarded paranoid and poor insight and lacks motivation social interaction series no side effects his blood pressure today 108/66 pulse 120 we will do a TSH on him and EKG

## 2021-02-27 NOTE — BH NOTES
Pt presents calm, blunted in affect, isolative to self in room in bed w/ lights off w/ blanket pulled over his head, irritable and minimally conversational on approach, limited interest in conversation w/ staff, verbalizes desire to be left alone, accepted meds w/o complaint  No bx issues noted, med compliant  Denies SI HI NSSI denies sleep med appetite problems denies AVH  Continuing to monitor

## 2021-02-28 PROCEDURE — 65220000003 HC RM SEMIPRIVATE PSYCH

## 2021-02-28 PROCEDURE — 74011250637 HC RX REV CODE- 250/637: Performed by: PSYCHIATRY & NEUROLOGY

## 2021-02-28 PROCEDURE — 74011250636 HC RX REV CODE- 250/636: Performed by: PSYCHIATRY & NEUROLOGY

## 2021-02-28 RX ADMIN — HALOPERIDOL LACTATE 5 MG: 5 INJECTION, SOLUTION INTRAMUSCULAR at 09:06

## 2021-02-28 RX ADMIN — QUETIAPINE FUMARATE 100 MG: 100 TABLET ORAL at 21:40

## 2021-02-28 RX ADMIN — QUETIAPINE FUMARATE 100 MG: 100 TABLET ORAL at 09:07

## 2021-02-28 RX ADMIN — QUETIAPINE FUMARATE 100 MG: 100 TABLET ORAL at 17:05

## 2021-02-28 NOTE — BH NOTES
Patient has been feeling slightly better. He has been up and out of his room to get his breakfast.  He states feeling slightly better. Less perplexed. Not voiced any active suicidal thoughts or confusion. Mental status examination  More alert casually dressed limited conversation but coherent. Reports feeling better.     Assessment plan  Continue current medications  Provided support      Current Facility-Administered Medications:     haloperidol lactate (HALDOL) injection 5 mg, 5 mg, IntraMUSCular, BID, Fiona WALKER MD, 5 mg at 02/28/21 0906    OLANZapine (ZyPREXA) tablet 5 mg, 5 mg, Oral, Q6H PRN, Ewa Quijano MD    haloperidol lactate (HALDOL) injection 5 mg, 5 mg, IntraMUSCular, Q6H PRN, Nigel Bates MD    benztropine (COGENTIN) tablet 1 mg, 1 mg, Oral, BID PRN, Nigel Bates MD    diphenhydrAMINE (BENADRYL) injection 50 mg, 50 mg, IntraMUSCular, BID PRN, Nigel Bates MD    hydrOXYzine HCL (ATARAX) tablet 50 mg, 50 mg, Oral, TID PRN, Ewa Quijano MD    LORazepam (ATIVAN) injection 1 mg, 1 mg, IntraMUSCular, Q4H PRN, Nigel Bates MD    traZODone (DESYREL) tablet 50 mg, 50 mg, Oral, QHS PRN, Nigel Bates MD    acetaminophen (TYLENOL) tablet 650 mg, 650 mg, Oral, Q4H PRN, Ewa Quijano MD    magnesium hydroxide (MILK OF MAGNESIA) 400 mg/5 mL oral suspension 30 mL, 30 mL, Oral, DAILY PRN, Ewa Quijano MD    acetaminophen (TYLENOL) tablet 650 mg, 650 mg, Oral, Q6H PRN **OR** acetaminophen (TYLENOL) suppository 650 mg, 650 mg, Rectal, Q6H PRN, Saeed Sen MD    QUEtiapine (SEROquel) tablet 100 mg, 100 mg, Oral, TID, Nigel Bates MD, 100 mg at 02/28/21 0907    traZODone (DESYREL) tablet 50 mg, 50 mg, Oral, QHS PRN, Ty Quijano MD    acetaminophen (TYLENOL) tablet 650 mg, 650 mg, Oral, Q4H PRN, Ty Quijano MD    hydrOXYzine pamoate (VISTARIL) capsule 50 mg, 50 mg, Oral, QID PRN, Ewa Martinez MD    ziprasidone (Darlys Rubins) 20 mg in sterile water (preservative free) 1 mL injection, 20 mg, IntraMUSCular, Q12H PRN, Madison Messina MD, 20 mg at 02/24/21 0255    influenza vaccine 2020-21 (4 yrs+)(PF) (FLUCELVAX QUAD) injection 0.5 mL, 0.5 mL, IntraMUSCular, PRIOR TO DISCHARGE, Madison Messina MD

## 2021-02-28 NOTE — GROUP NOTE
IP  GROUP DOCUMENTATION INDIVIDUAL Group Therapy Note Date: 2/28/2021 Group Start Time: 1100 Group End Time: 9324 Group Topic: Process Group - Inpatient SRM 2  NON ACUTE Shawnamiriam Bailey [facility] GROUP DOCUMENTATION GROUP Group Therapy Note This writer facilitated a process group geared towards the following topics: support systems, support groups, depression, anxiety, and the discharge process. This writer provided the patient with a handout labeled, \"How to [address]. \" This writer explained the benefits of outpatient therapy, medication management, and case management. This writer tasked the patient with identify their coping mechanisms or one strategy that can be used to improve mental health after discharge. Attendees: 6 out of 13 Attendance: Did not attend Patient's Goal:  N/A Interventions/techniques: N/A Follows Directions: N/A Interactions: N/A Mental Status: N/A Behavior/appearance: N/A Goals Achieved: N/A Additional Notes:  Patient refused to attend group despite encouragement. [place]

## 2021-02-28 NOTE — BH NOTES
Dx: Schizoaffective D/O    Affect restricted; however, was OOR at the beginning of the shift, in the solarium. Pt has been isolating. Delusional; said if he takes a shower, he will break out in welps. Anxious. Frequently asked when he will be d/c. Compliant with scheduled meds. Cont to say he doesn't need medications. The importance of med compliance explained/emphasized. Appearance disheveled; encouraged to shower and change his gowns. Encouraged to attend groups. Q 15 mins checks maintained, for safety.

## 2021-02-28 NOTE — BH NOTES
Patient reports he has been taking his medication. Appeared somewhat perplexed this morning. He remains guarded with impaired insight and judgment. He denies having any questions for me. He wants to lie down. Mental status examination-patient is alert oriented to name being in the hospital perplexed this morning. Remains paranoid somewhat guarded impaired insight and judgment.     Assessment plan  No side effects voiced no active suicidal or homicidal ideations voiced  Encouraged participation in group therapy  Continue current medications    Current Facility-Administered Medications:     haloperidol lactate (HALDOL) injection 5 mg, 5 mg, IntraMUSCular, BID, Madison Messina MD, 5 mg at 02/27/21 0935    OLANZapine (ZyPREXA) tablet 5 mg, 5 mg, Oral, Q6H PRN, Thalia Quijano MD    haloperidol lactate (HALDOL) injection 5 mg, 5 mg, IntraMUSCular, Q6H PRN, Madison Messina MD    benztropine (COGENTIN) tablet 1 mg, 1 mg, Oral, BID PRN, Thalia Quijano MD    diphenhydrAMINE (BENADRYL) injection 50 mg, 50 mg, IntraMUSCular, BID PRN, Madison Messina MD    hydrOXYzine HCL (ATARAX) tablet 50 mg, 50 mg, Oral, TID PRN, Thalia Quijano MD    LORazepam (ATIVAN) injection 1 mg, 1 mg, IntraMUSCular, Q4H PRN, Madison Messina MD    traZODone (DESYREL) tablet 50 mg, 50 mg, Oral, QHS PRN, Madison Messina MD    acetaminophen (TYLENOL) tablet 650 mg, 650 mg, Oral, Q4H PRN, Thalia Quijano MD    magnesium hydroxide (MILK OF MAGNESIA) 400 mg/5 mL oral suspension 30 mL, 30 mL, Oral, DAILY PRN, Thalia Quijano MD    acetaminophen (TYLENOL) tablet 650 mg, 650 mg, Oral, Q6H PRN **OR** acetaminophen (TYLENOL) suppository 650 mg, 650 mg, Rectal, Q6H PRN, Kamala Wing MD    QUEtiapine (SEROquel) tablet 100 mg, 100 mg, Oral, TID, Madison Messina MD, 100 mg at 02/27/21 1654    traZODone (DESYREL) tablet 50 mg, 50 mg, Oral, QHS PRN, Madison Messina MD    acetaminophen (TYLENOL) tablet 650 mg, 650 mg, Oral, Q4H PRN, Malika Quijano MD    hydrOXYzine pamoate (VISTARIL) capsule 50 mg, 50 mg, Oral, QID PRN, Malika Quijano MD    ziprasidone (GEODON) 20 mg in sterile water (preservative free) 1 mL injection, 20 mg, IntraMUSCular, Q12H PRN, Eloisa Mayo MD, 20 mg at 02/24/21 0255    influenza vaccine 2020-21 (4 yrs+)(PF) (FLUCELVAX QUAD) injection 0.5 mL, 0.5 mL, IntraMUSCular, PRIOR TO DISCHARGE, Eloisa Mayo MD

## 2021-02-28 NOTE — BH NOTES
Received patient in bed asleep. No complaints or distress noted. Respirations unlabored. Close observation maintained.

## 2021-03-01 PROCEDURE — 65220000003 HC RM SEMIPRIVATE PSYCH

## 2021-03-01 PROCEDURE — 74011250637 HC RX REV CODE- 250/637: Performed by: PSYCHIATRY & NEUROLOGY

## 2021-03-01 RX ORDER — QUETIAPINE FUMARATE 100 MG/1
200 TABLET, FILM COATED ORAL
Status: DISCONTINUED | OUTPATIENT
Start: 2021-03-02 | End: 2021-03-06 | Stop reason: HOSPADM

## 2021-03-01 RX ADMIN — QUETIAPINE FUMARATE 100 MG: 100 TABLET ORAL at 16:27

## 2021-03-01 RX ADMIN — QUETIAPINE FUMARATE 100 MG: 100 TABLET ORAL at 09:02

## 2021-03-01 RX ADMIN — QUETIAPINE FUMARATE 100 MG: 100 TABLET ORAL at 21:48

## 2021-03-01 NOTE — BH NOTES
Patient denies SI/HI/AH/VH. Up to dining room for meals, appetite good, ate snacks, juice, diet sprite. Attended groups. Appears sad, flat affect, does not appear anxious. Responsive to interview, states he \"feels better\". Took medication as ordered. NO distress noted.

## 2021-03-01 NOTE — PROGRESS NOTES
Problem: Klaudia/Hypomania  Goal: *LTG: Reduce psychic energy and return to normal activity levels, good judgement, stable mood, and goal-directed behavior  Outcome: Progressing Towards Goal  Mood appears more stable, appropriate speech, showed some insight, pt stated that he felt better, and thought that the medication was working. Goal: *LTG: Reduce agitation, impulsivity, and pressured speech  Description: Reduce agitation, impulsivity, and pressured speech while achieving sensitivity to the consequences of behavior and having more realistic expectations. Outcome: Progressing Towards Goal    Calm and in control, no impulsivity observed  Goal: *LTG: Achieve controlled behavior, moderated  mood, and more deliberative speech and thought processes  Description: Achieve controlled behavior, moderated  mood, and more deliberative speech and thought processes through psychotherapy and medication.   Outcome: Progressing Towards Goal      Goal: *STG: Sleep about 5 hours or more per night  Outcome: Progressing Towards Goal    Goal achieved, slept 5+ hours  Goal: *STG: Comply with psychotropic medications as prescribed  Outcome: Progressing Towards Goal    Medication compliant

## 2021-03-01 NOTE — GROUP NOTE
[facility] GROUP DOCUMENTATION INDIVIDUAL Group Therapy Note Date: 3/1/2021 Group Start Time: 1300 Group End Time: 6189 Group Topic: Process Group - Inpatient SRM 2  NON ACUTE [facility] GROUP DOCUMENTATION GROUP Group Therapy Note Attendees: 3 Patients were encouraged to process their thoughts, feelings and current situations. Patients focused on topics discussing reasons for their admission to the hospital. We ended the group with discussing and reviewing coping skills to handle difficult situations. Patients were encouraged to provide support to each other. Attendance: Attended Patient's Goal:  Pt will utilize this group time to process his thoughts and feelings. Interventions/techniques: Validated, Promoted peer support and Supported Follows Directions: Followed directions Interactions: Interacted appropriately Mental Status: Calm and Preoccupied Behavior/appearance: Attentive and Cooperative Goals Achieved: Able to engage in interactions and Able to listen to others Additional Notes:  Pt appeared preoccupied at times and demonstrated some inappropriate laughter. He talked about his current situation and appeared to have poor insight into his situation.   
 
Armani Hummel, [address] [facility], [address] [place] [address]

## 2021-03-01 NOTE — BH NOTES
Pt remained in his room for the majority of the shift. Pleasant on approach, speaking in soft undertones. Pt stated that he felt that his medication was working, feels better in himself. However does not understand why he has to take the tablet and injection Mother also called Nadeem Montalvo [phone] expressing concern about both tablet and IM medication being given. Message left for Nursing Supervisor to clarify in treatment team, and  or NS to call mother back.

## 2021-03-01 NOTE — GROUP NOTE
IP  GROUP DOCUMENTATION INDIVIDUAL Group Therapy Note Date: 3/1/2021 Group Start Time: 3400 Group End Time: 4613 Group Topic: Special Track - Drug Topic SRM 2 [facility] ACUTE Rodrigo Baumann, RT 
 
[facility] GROUP DOCUMENTATION GROUP Group Therapy Note Transition, Stabilization and Early Recovery Attendees: 4 Attendance: Did not attend Additional Notes:  Pt was invited to attend group but did not.  
 
Hanna Duncan, RT

## 2021-03-01 NOTE — GROUP NOTE
IP  GROUP DOCUMENTATION INDIVIDUAL Group Therapy Note Date: 3/1/2021 Group Start Time: 4002 Group End Time: 1200 Group Topic: Education Group - Inpatient SRM 2  NON ACUTE Molly Coates 
 
[facility] GROUP DOCUMENTATION GROUP Group Therapy Note Facilitated  discussion focused on different characteristics of a healthy relationship Attendees: 6 out of 12 Attendance: Attended Patient's Goal:  *STG: Attends activities and groups Interventions/techniques: Informed and Supported Follows Directions: Followed directions Interactions: Interacted appropriately Mental Status: Calm and Flat Behavior/appearance: Cooperative and Needed prompting Goals Achieved: Able to engage in interactions, Able to listen to others and Able to self-disclose Additional Notes:  Pt was receptive to information discussed and was able to recognize some attitudes and behaviors that are present in his relationship such as being able to communicate and listen , does not attempt to fix or control the other and letting go of the need to be right Lenward Peirce

## 2021-03-01 NOTE — GROUP NOTE
IP  GROUP DOCUMENTATION INDIVIDUAL Group Therapy Note Date: 3/1/2021 Group Start Time: 1400 Group End Time: 1500 Group Topic: Special Track - Drug Topic SRM 2 BEHA TH ACUTE Маринаalpa Uriarte, RT 
 
[facility] GROUP DOCUMENTATION GROUP Group Therapy Note The Progressive Stages of Recovery Attendees: 5 Attendance: Did not attend Additional Notes:  Pt was invited to attend group but did not attend.  
 
Dre Velazco, RT

## 2021-03-01 NOTE — BH NOTES
Behavioral Health Treatment Team Note     Patient goal(s) for today: 'to get through the day'  Treatment team focus/goals: Conduct family session, continue medication management and group therapy    Progress note: Pt presented with a flat affect, organised speech and thought and has a soft speech. Pt presented as anxious as he was shaking his leg and fidgety with his hands. Pt denied SI/HI and AVH. Pt said he doesn't understand why he is being given medication and writer offered to get pt a list of information about his medications but pt denied wanting this. Pt said that he wants to 'get home soon to see son' and said that the world is a 'cold place' and he wants to be there for his son. Writer sympathized with pt and spoke about how he is working on himself so he can be the best version for his son.  Pt still needs an inpatient level of care due to not being at baseline and still experiencing symptoms     LOS:  7  Expected LOS: 7- 10 days    Insurance info/prescription coverage:  Self pay, medicaid pending  Date of last family contact:  2.25.21  Family requesting physician contact today:  no  Discharge plan:   and pt will work together to coordinate this prior to discharge  Guns in the home:  no   Outpatient provider(s):  None at this time    Participating treatment team members: Arian Calderon, * (assigned SW), JERRY

## 2021-03-02 PROCEDURE — 74011250637 HC RX REV CODE- 250/637: Performed by: PSYCHIATRY & NEUROLOGY

## 2021-03-02 PROCEDURE — 74011000250 HC RX REV CODE- 250: Performed by: PSYCHIATRY & NEUROLOGY

## 2021-03-02 PROCEDURE — 65220000003 HC RM SEMIPRIVATE PSYCH

## 2021-03-02 PROCEDURE — 74011250636 HC RX REV CODE- 250/636: Performed by: PSYCHIATRY & NEUROLOGY

## 2021-03-02 RX ADMIN — LORAZEPAM 1 MG: 2 INJECTION INTRAMUSCULAR at 14:02

## 2021-03-02 RX ADMIN — QUETIAPINE FUMARATE 200 MG: 100 TABLET ORAL at 21:33

## 2021-03-02 RX ADMIN — ZIPRASIDONE MESYLATE 20 MG: 20 INJECTION, POWDER, LYOPHILIZED, FOR SOLUTION INTRAMUSCULAR at 14:03

## 2021-03-02 NOTE — BH NOTES
Behavioral Health Treatment Team Note     Patient goal(s) for today: 'to go home'  Treatment team focus/goals: Family session at 1pm with FELIX ,medication management, group therapy    Progress note: Pt presented with a broad and manic affect. Pt stated that if he did not leave today he was going to leave. Pt enquired about what would happen if he left on his own accord. Pt stated that he needs to go home to 'make money'. Pt very upset about DC and stated that he was going to 'punch someone to go home'. Pt motioning towards the doors during conversation implying that he would elope. Pt stated that his anxiety and depression were 0/10 and denies any AVH. Pt presenting with manic affect and not ready for dc at this time due to needed a family therapy session at 1pm today. If appropriate therapist will coordinate DC plans.      LOS:  8  Expected LOS: 9    Insurance info/prescription coverage:  Self pay, medicaid pending  Date of last family contact:  'I dont know three or four days ago'  Family requesting physician contact today:  no  Discharge plan:   and pt will work together to determine [address] in the home:  no   Outpatient provider(s):  None at this time will be coordinated prior to DC     Participating treatment team members: Milka Alfaro, * (assigned SW), [employer]

## 2021-03-02 NOTE — GROUP NOTE
IP  GROUP DOCUMENTATION INDIVIDUAL Group Therapy Note Date: 3/2/2021 Group Start Time: 1400 Group End Time: 1500 Group Topic: Special Track - Drug Topic SRM 2 [facility] ACUTE Suzanna Hummel, RT 
 
[facility] GROUP DOCUMENTATION GROUP Group Therapy Note Recovery & Relapse Attendees: 5 Attendance: Did not attend Additional Notes:  Pt was invited to attend group but did not attend Beti Watson, RT

## 2021-03-02 NOTE — GROUP NOTE
IP  GROUP DOCUMENTATION INDIVIDUAL 
 
 
                                                                    Group Therapy Note 
 
Date: 3/2/2021 
 
Group Start Time: 1600 
Group End Time: 1700 
Group Topic: Special Track - Drug Topic 
 
SRM 2 BEHA TH ACUTE 
 
Nahum Guadalupe, RT 
 
[facility] GROUP DOCUMENTATION GROUP 
 
Group Therapy Note 
 
Defence Mechanisms 
 
Attendees: 4 
 
  
 
Attendance: Did not attend 
 
 
Additional Notes:  Pt was invited to attend group but did not . 
 
Nahum Guadalupe, RT
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
## 2021-03-02 NOTE — BH NOTES
Patient demanding to go home, standing at back unit door pushing on door,yelling, carrying belongings, cursing, verbally aggressive, staff attempt to calm and talk to patient unsuccessfully. Code JAZMÍN called at [phone]. Patient continued to demand to go home stating  \"Call the [employer] police, I'm a grown fucking man, I can leave anytime I want\". Patient continue to push on back unit door and walked onto front unit standing at front unit door. Patient continued yelling, cursing, stating he was leaving, threatening to fight. \"Don't fucking give me no shot! \" \"Yall are going to have to fight me\". Staff continued to talk to resident to calm him. Code Nikhil called at [phone]. Patient walked back to back unit. Patient continued to be verbally aggressive, yelling cursing stating he was going to leave. Order obtained for seclusion if needed per MD. Patient walked into the quiet room with staff doors remained open. Patient continued yelling and cursing, then crying. Staff continued to talk to resident and patient agreed to take injections. Patient given IM Ativan and Geodon 1358. At 1400 patient laid down on mattress calmly in quiet room in seclusion with staff observation. Seclusion room door opened 1410 and patient continued calmly laying on mattress with staff observation. Patient got up and walked out of seclusion room calmly at [phone], patient is laying down in room resting at this time.

## 2021-03-02 NOTE — BH NOTES
Patient has been up and active on the unit. He alert & oriented X 4. He affect is normal. He is medication compliant. Denies side effect. He denies SI/HI, AH & VH. He remains on close observation for safety.
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
## 2021-03-02 NOTE — GROUP NOTE
[facility] GROUP DOCUMENTATION INDIVIDUAL Group Therapy Note Date: 3/2/2021 Group Start Time: 1300 Group End Time: 1400 Group Topic: Process Group - Inpatient [facility] 2  NON ACUTE [facility] GROUP DOCUMENTATION GROUP Group Therapy Note Attendees: 7 out of 12 1PM Process Group Patients were encouraged to discuss their thoughts, feelings, and emotions while in the group setting. Patients were asked to share their \"goal for the day\" and to share how they were feeling otherwise. Lastly, the patients were asked to listen respectfully to and to be supportive of their peers. Attendance: Did not attend Patient's Goal:  N/A Interventions/techniques: Other N/A Follows Directions: Other N/A Interactions: Other N/A Mental Status: Other N/A Behavior/appearance: N/A Goals Achieved: N/A Additional Notes:  Pt did not attend group despite having been encouraged to do so. [place]

## 2021-03-03 PROCEDURE — 74011250637 HC RX REV CODE- 250/637: Performed by: PSYCHIATRY & NEUROLOGY

## 2021-03-03 PROCEDURE — 74011250636 HC RX REV CODE- 250/636: Performed by: PSYCHIATRY & NEUROLOGY

## 2021-03-03 PROCEDURE — 65220000003 HC RM SEMIPRIVATE PSYCH

## 2021-03-03 RX ORDER — DIVALPROEX SODIUM 125 MG/1
125 TABLET, DELAYED RELEASE ORAL 2 TIMES DAILY
Status: DISCONTINUED | OUTPATIENT
Start: 2021-03-03 | End: 2021-03-06 | Stop reason: HOSPADM

## 2021-03-03 RX ORDER — HALOPERIDOL DECANOATE 50 MG/ML
50 INJECTION INTRAMUSCULAR ONCE
Status: COMPLETED | OUTPATIENT
Start: 2021-03-03 | End: 2021-03-03

## 2021-03-03 RX ADMIN — HYDROXYZINE HYDROCHLORIDE 50 MG: 50 TABLET, FILM COATED ORAL at 21:29

## 2021-03-03 RX ADMIN — DIVALPROEX SODIUM 125 MG: 125 TABLET, DELAYED RELEASE ORAL at 21:28

## 2021-03-03 RX ADMIN — HALOPERIDOL DECANOATE 50 MG: 50 INJECTION INTRAMUSCULAR at 14:18

## 2021-03-03 RX ADMIN — QUETIAPINE FUMARATE 200 MG: 100 TABLET ORAL at 21:29

## 2021-03-03 NOTE — BH NOTES
Nursing Note    Patient is alert and oriented x 4. He denies any SI/HI/AH/VH. Patient denies any anxiety or depression. Restricted affect and calm mood. Patient seen watching TV and talking on the phone in the dayroom. Staff will continue to monitor patient for safety.

## 2021-03-03 NOTE — GROUP NOTE
IP  GROUP DOCUMENTATION INDIVIDUAL Group Therapy Note Date: 3/3/2021 Group Start Time: 1400 Group End Time: 1500 Group Topic: Special Track - Drug Topic SRM 2 [facility] ACUTE Aylin Rose, RT 
 
[facility] GROUP DOCUMENTATION GROUP Group Therapy Note Stages of Change Attendees: 7 Attendance: Attended Patient's Goal:  To attend groups Interventions/techniques: Informed Follows Directions: Followed directions Interactions: Interacted appropriately Mental Status: Calm Behavior/appearance: Cooperative Goals Achieved: Able to listen to others and Able to receive feedback Additional Notes:  Pt attended group and was disruptive to group by talking to another pt. When asked to respect the group he acknowledge and did so.  
 
Christina Magallon, RT

## 2021-03-03 NOTE — BH NOTES
This treatment team this morning patient getting anxious and want to go home a family session was arranged as he has to live with his father as the day gone by he became increasingly anxious irritable Edith pressured speech even though he is interacting well with his new peers. Apparently escalated unable to calm him down he was aggressive agitated threatening to punch people in the face requiring intramuscular injection quiet room he settled down, at this point patient insight is poor judgment is poor unable to manage his anger frustrations and danger to others continued inpatient level of care indicated we talked about giving the long-acting injection from her father's perspective is been going on sick for the last 3 years.

## 2021-03-03 NOTE — GROUP NOTE
[facility] GROUP DOCUMENTATION INDIVIDUAL Group Therapy Note Date: 3/3/2021 Group Start Time: 1100 Group End Time: 3964 Group Topic: Education Group - Inpatient [facility] GROUP DOCUMENTATION GROUP Group Therapy Note Attendees: 9 Patients were encouraged to discuss warning signs, coping strategies, and social support networks that can be contacted in the event of a suicidal crisis. Patients were encouraged to complete a suicide safety plan documents. Attendance: Attended Patient's Goal:  Attends activities and groups. Interventions/techniques: Informed, Validated, Provide feedback, Reinforced and Supported Follows Directions: Followed directions Interactions: Disorganized interaction Mental Status: Preoccupied and Restricted Behavior/appearance: Cooperative Goals Achieved: Able to listen to others, Able to self-disclose and Discussed safety plan Additional Notes: Pt interacted appropriately with his peers. Pt required assistance with developing his suicidal safety plan. Pt was unable to complete all sections of his safety plan at this time. Pt presented with disorganized thoughts and seemed to be interacting with internal stimuli at times. Pt was cooperative despite this. Pt continues to work toward his goal of attending individual and group therapy. Sd Cary, Social Work Intern

## 2021-03-03 NOTE — GROUP NOTE
[facility]  GROUP DOCUMENTATION INDIVIDUAL Group Therapy Note Date: 3/3/2021 Group Start Time: 3705 Group End Time: 1000 Group Topic: [employer] SRM 2 BH NON ACUTE Roxanna BOWSER  GROUP DOCUMENTATION GROUP Group Therapy Note Attendees: 8 Attendance: Attended Patient's Goal:  To be discharged soon Interventions/techniques: Supported Follows Directions: Followed directions Interactions: Interacted appropriately Mental Status: Other Living Behavior/appearance: Cooperative Goals Achieved: Able to engage in interactions Additional Notes:   
 
Prem Fleming

## 2021-03-03 NOTE — BH NOTES
Behavioral Health Treatment Team Note     Patient goal(s) for today: 'Try to make it closer to DC today'  Treatment team focus/goals: behavior management, DC Planning, family session, group therapy medication management    Progress note: Pt presented with an elevated mood and broad affect. Pt reports that he is feeling 'better today'. Pt stated that 'I'm here so I just have to make the best out of it'. Pt reported that his depression was a 0/10 and that his anxiety was a 9/10. Pt reports that he was 'vibing and cool'. Pt reported that he had a good night and that he just wanted to go home.  Pt and therapist will continue to work on DC planning and monitoring behaviors to ensure pt is appropriate for DC     LOS:  9  Expected LOS: 10    Insurance info/prescription coverage:  MEDICAID PENDING/[place] MEDICAID PENDING  Date of last family contact:  3.3.21  Family requesting physician contact today:  no  Discharge plan:  Pt and therapist will continue to work together to determine [address] in the home:  no   Outpatient provider(s):  None at this time but will be coordinated prior to DC     Participating treatment team members: Shalini Bender, * (assigned SW), [employer]

## 2021-03-03 NOTE — BH NOTES
Patient denies any SI/HI. Patient was given his long acting injection of haldol decanoate. Patient was noted to be running throughout the unit afterwards, doing jumping jacks, and pretending to shoot a basketball. Patient was pleasant. He is social with select peers. Patient has poor boundaries with this writer, he needed several redirection. Patient attended groups throughout the day.

## 2021-03-03 NOTE — GROUP NOTE
IP  GROUP DOCUMENTATION INDIVIDUAL Group Therapy Note Date: 3/3/2021 Group Start Time: 5064 Group End Time: 3701 Group Topic: Special Track - Drug Topic SRM 2 [facility] ACUTE Skeet Tyronza, RT 
 
[facility] GROUP DOCUMENTATION GROUP Group Therapy Note Feelings Attendees: 7 Attendance: Attended Patient's Goal:  To attend groups Interventions/techniques: Informed Follows Directions: Followed directions Interactions: Interacted appropriately Mental Status: Hallucinations Behavior/appearance: Withdrawn/quiet Goals Achieved: Able to listen to others and Able to receive feedback Additional Notes:  Pt attended group and appeared to be experiencing hallucinations. When he was asked what was going on he appeared to be drifting off for a few seconds and than sat quietly.   
 
Jose Luis Cat, RT

## 2021-03-03 NOTE — PROGRESS NOTES
Arian Calderno actively participated in Spirituality Group about 45 mins on 2 Acute care Behavioral Health unit  Visited by: Jerene Sacks.    can be reached by calling the  at [facility]  [phone]
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
## 2021-03-04 PROCEDURE — 65220000003 HC RM SEMIPRIVATE PSYCH

## 2021-03-04 PROCEDURE — 74011250637 HC RX REV CODE- 250/637: Performed by: PSYCHIATRY & NEUROLOGY

## 2021-03-04 RX ADMIN — DIVALPROEX SODIUM 125 MG: 125 TABLET, DELAYED RELEASE ORAL at 08:45

## 2021-03-04 RX ADMIN — DIVALPROEX SODIUM 125 MG: 125 TABLET, DELAYED RELEASE ORAL at 21:22

## 2021-03-04 RX ADMIN — HYDROXYZINE HYDROCHLORIDE 50 MG: 50 TABLET, FILM COATED ORAL at 21:22

## 2021-03-04 RX ADMIN — QUETIAPINE FUMARATE 200 MG: 100 TABLET ORAL at 21:22

## 2021-03-04 NOTE — GROUP NOTE
IP  GROUP DOCUMENTATION INDIVIDUAL Group Therapy Note Date: 3/4/2021 Group Start Time: 4953 Group End Time: 8009 Group Topic: Special Track - Drug Topic SRM 2 BEHA TH ACUTE Noel Homans, RT 
 
[facility] GROUP DOCUMENTATION GROUP Group Therapy Note Stages of Recovery Attendees: 4 Attendance: Did not attend Additional Notes:  Pt was invited to attend group but did not attend.  
 
Bonnie Parra, RT

## 2021-03-04 NOTE — BH NOTES
Patient case discussed in the morning also yesterday is acting out discussed in the treatment team and family thoughts feeling dispositional issues addressed. Patient seen together along with the Ross Medeiros  and [address] [place] [address] nurse manager and patient has this impression that he is a voluntary patient he can go anytime a a copy of the commitment hearing borders shown to him for a while history of we will try to keep track for the date and the number of days he has been here ultimately accepted that he continue his good still commitment hearing still Saturday. And medications also adjusted discussed about long-acting injection father remains very much fearful and scared often because of his mood swings tirades and also place him on Depakote 400 mg twice a day patient little bit disappointed but accepting it.   No side effect from medication noted patient is willing to work towards the goal of living on this Saturday Depakote 400 mg added Haldol Decanoate also added 50 mg daily 50 mg monthly thank you

## 2021-03-04 NOTE — GROUP NOTE
[facility] GROUP DOCUMENTATION INDIVIDUAL Group Therapy Note Date: 3/4/2021 Group Start Time: 1100 Group End Time: 9823 Group Topic: Education Group - Inpatient [facility] Eden Abraham [facility] GROUP DOCUMENTATION GROUP Group Therapy Note Attendees: All pts were encouraged to attend however only 5 out of 9 attended. Pts were asked to introduce themselves and then to state something positive that has happened to them recently. The pts were asked if they've ever meditated before. We then listened to a 10 minute guided meditation as a group. Afterwards the pts were asked to reflect on their feelings and to identify whether they liked the meditation. We then discussed the importance of meditation. Attendance: Attended Patient's Goal:  To attend groups and activities Interventions/techniques: Informed, Validated, Provide feedback and Supported Follows Directions: Followed directions Interactions: Interacted appropriately Mental Status: Calm and Congruent Behavior/appearance: Attentive and Cooperative Goals Achieved: Able to engage in interactions, Able to listen to others and Able to self-disclose Additional Notes:  Pt identified learning he was discharging Saturday as his most recent positive event. He reported meditating before and stated that is is all he does here bc they have the time. He stated that typically he smokes weed and uses other bad \"vices\" to cope. Tania Fuchs

## 2021-03-04 NOTE — BH NOTES
Patient has been up & active on the unit. Socializing with peers in the day room. He alert & oriented X 3. Patient denies depression, anxious, SI, HI, AH & VH. He remains on close observation for safety.

## 2021-03-04 NOTE — GROUP NOTE
[facility] GROUP DOCUMENTATION INDIVIDUAL Group Therapy Note Date: 3/4/2021 Group Start Time: 1300 Group End Time: 2216 Group Topic: Process Group - Inpatient [facility] Jared Rojo [facility] GROUP DOCUMENTATION GROUP Group Therapy Note Attendees: 7/8 Process: Pts were given a sheet with questions about their memories, things they have learned since admission and process through it. Pts were encouraged to provide feedback to each other. Pts were walked through a breathing exercise before being asked to reflect on group Attendance: Attended Patient's Goal:  Pt said he is 'focused on himself and to make it to Saturday' Interventions/techniques: Validated, Promoted peer support, Provide feedback and Supported Follows Directions: Followed directions Interactions: Interacted appropriately Mental Status: Anxious and Flat Behavior/appearance: Attentive, Motivated, Neatly groomed and Withdrawn/quiet Goals Achieved: Able to engage in interactions, Able to listen to others and Able to reflect/comment on own behavior Additional Notes:  Pt was quiet throughout most of group but paid attention to peers. One pt was trying to provoke pt and pt did very well about ignoring it while writer redirected the other pt. Pt is making progress towards goals by attending and participating in group [facility]

## 2021-03-04 NOTE — BH NOTES
Behavioral Health Treatment Team Note     Patient goal(s) for today: 'stay focused on treatment and myself'  Treatment team focus/goals: continue medication management, group therapy and coordinate services prior to discharge    Progress note: Pt presented neatly groomed, soft spoken with flat affect and organised speech and thought. Pt said that he 'hasn't felt suicidal in a few days' and that he recognised he 'got frustrated' a few days ago due to a 'miscommunication about discharge'. Pt said he feels 'a bit anxious' and denied depression, AVH and HI. Pt said he is 'learning coping skills' and is looking forward to returning home in a few days. Pt still needs an inpatient level of care due to continuing to work on behaviour and developing safety plan, services need to be coordinated prior to discharge. LOS:  10  Expected LOS: 10-12 days    Insurance info/prescription coverage:  Medicaid  Date of last family contact: 2.25.21  Family requesting physician contact today:  no  Discharge plan:   and pt will work together to coordinate this   Guns in the home:  no   Outpatient provider(s):   Will be coordinated prior to discharge    Participating treatment team members: Arian Calderon, * (assigned SW), JERRY

## 2021-03-04 NOTE — GROUP NOTE
IP  GROUP DOCUMENTATION INDIVIDUAL Group Therapy Note Date: 3/4/2021 Group Start Time: 1400 Group End Time: 1500 Group Topic: Special Track - Drug Topic SRM 2 BEHA HLTH ACUTE Leonela Dove, RT 
 
[facility] GROUP DOCUMENTATION GROUP Group Therapy Note Protracted Withdrawal  
 
Attendees: 5 Attendance: Attended Patient's Goal:  To attend groups Interventions/techniques: Informed Follows Directions: Followed directions Interactions: Interacted appropriately Mental Status: Flat and Preoccupied Behavior/appearance: Cooperative Goals Achieved: Able to listen to others and Able to receive feedback Additional Notes:  Pt attended group and appeared preoccupied, leaving the group room several times and talking during group.  
 
Carol Carranza, RT

## 2021-03-04 NOTE — BH NOTES
Pt.is up and visible on unit, affect is brighter,appetite good, appearance is neat, denies feeling suicidal or depressed. Pt. Is interacting well with peers,, less paranoia, attending groups, no other concerns voiced, remains on close observation.

## 2021-03-04 NOTE — BH NOTES
Writer spoke w pt's dad about his feelings about the pt returning home. Previously the dad had stated that he felt uncomfortable about him coming back. Writer updated the dad on the pt's progress and let him know that he has agreed to a long acting injection and is now on a mood stabilizer. The dad reported that he and his wife have been talking about doing whatever the pt needs and are both comfortable w him returning home.

## 2021-03-05 PROCEDURE — 74011250637 HC RX REV CODE- 250/637: Performed by: PSYCHIATRY & NEUROLOGY

## 2021-03-05 PROCEDURE — 65220000003 HC RM SEMIPRIVATE PSYCH

## 2021-03-05 RX ORDER — QUETIAPINE FUMARATE 200 MG/1
200 TABLET, FILM COATED ORAL
Qty: 30 TAB | Refills: 0 | Status: SHIPPED | OUTPATIENT
Start: 2021-03-05

## 2021-03-05 RX ORDER — TRAZODONE HYDROCHLORIDE 50 MG/1
50 TABLET ORAL
Qty: 30 TAB | Refills: 0 | Status: SHIPPED | OUTPATIENT
Start: 2021-03-05

## 2021-03-05 RX ORDER — DIVALPROEX SODIUM 125 MG/1
125 TABLET, DELAYED RELEASE ORAL 2 TIMES DAILY
Qty: 60 TAB | Refills: 0 | Status: SHIPPED | OUTPATIENT
Start: 2021-03-05

## 2021-03-05 RX ADMIN — QUETIAPINE FUMARATE 200 MG: 100 TABLET ORAL at 21:17

## 2021-03-05 RX ADMIN — DIVALPROEX SODIUM 125 MG: 125 TABLET, DELAYED RELEASE ORAL at 09:17

## 2021-03-05 RX ADMIN — DIVALPROEX SODIUM 125 MG: 125 TABLET, DELAYED RELEASE ORAL at 21:17

## 2021-03-05 NOTE — GROUP NOTE
IP  GROUP DOCUMENTATION INDIVIDUAL Group Therapy Note Date: 3/5/2021 Group Start Time: 2188 Group End Time: 3231 Group Topic: Recreational/Music Therapy SRM 2  NON ACUTE Junita Lockett IP  GROUP DOCUMENTATION GROUP Group Therapy Note Facilitated leisure skills group to re-enforce positive coping skills through music, social interactions, group activities, and art tasks Attendees: 8/10 Attendance: Attended Patient's Goal:  *STG: Attends activities and groups Interventions/techniques: Art integration and Supported Follows Directions: Followed directions Interactions: Interacted appropriately Mental Status: Calm Behavior/appearance: Cooperative Goals Achieved: Able to engage in interactions and Able to listen to others Additional Notes:  Pt was receptive to music and songs he selected in group but declined to work on art tasks. Pt left group early and did not return. Janine Huerta, RT Student

## 2021-03-05 NOTE — GROUP NOTE
[facility]  GROUP DOCUMENTATION INDIVIDUAL Group Therapy Note Date: 3/5/2021 Group Start Time: 1300 Group End Time: 3544 Group Topic: Process Group - Inpatient [facility] CARE MANAGEMENT Lexie Briceno LCSW [facility] GROUP DOCUMENTATION GROUP Group Therapy Note Pts participated in process group therapy. Pts began group by processing a situation in the back unit with a upset patient. Pts then discussed what brought them to the hospital and how they are feeling. Pt's then discussed how things are going and how they are feeling. Attendees: 4/11 Attendance: Did not attend Lucille Francis LCSW

## 2021-03-05 NOTE — BH NOTES
Patient case discussed in the treatment team and he seen his for compliant with medication treatment expectations calm polite no agitation no aggression and.   Understood and we are exploring the possibility with the new medication changes if the patient improves and whether he can return to stay with the father or her mother patient sometimes says he can live in the car course will also explore the option of crisis stabilization and

## 2021-03-05 NOTE — BH NOTES
Behavioral Health Transition Record to Provider    Patient Name: Mirela Wagner  YOB: 1995  Medical Record Number: [id number]  Date of Admission: 2/22/2021  Date of Discharge: 5/6/2021  Attending Provider: Troy Hill MD  Discharging Provider: Dr. Kim Ozuna  To contact this individual call [phone] and ask the  to page. If unavailable, ask to be transferred to [address] [place] [address] Provider on call. [facility] Provider will be available on call 24/7 and during holidays. Primary Care Provider: None    No Known Allergies    Reason for Admission: Psychosis    Admission Diagnosis: Klaudia ([facility] [address] [F30.9]    * No surgery found *    Results for orders placed or performed during the hospital encounter of 02/22/21   SARS-COV-2   Result Value Ref Range    SARS-CoV-2 Not Detected Not Detected     CBC WITH AUTOMATED DIFF   Result Value Ref Range    WBC 9.3 4.1 - 11.1 K/uL    RBC 5.08 4.10 - 5.70 M/uL    HGB 14.8 12.1 - 17.0 g/dL    HCT 43.9 36.6 - 50.3 %    MCV 86.4 80.0 - 99.0 FL    MCH 29.1 26.0 - 34.0 PG    MCHC 33.7 30.0 - 36.5 g/dL    RDW 13.4 11.5 - 14.5 %    PLATELET 313 716 - 260 K/uL    MPV 11.7 8.9 - 12.9 FL    NEUTROPHILS 66 32 - 75 %    LYMPHOCYTES 24 12 - 49 %    MONOCYTES 8 5 - 13 %    EOSINOPHILS 2 0 - 7 %    BASOPHILS 0 0 - 1 %    IMMATURE GRANULOCYTES 0 0.0 - 0.5 %    ABS. NEUTROPHILS 6.2 1.8 - 8.0 K/UL    ABS. LYMPHOCYTES 2.2 0.8 - 3.5 K/UL    ABS. MONOCYTES 0.7 0.0 - 1.0 K/UL    ABS. EOSINOPHILS 0.2 0.0 - 0.4 K/UL    ABS. BASOPHILS 0.0 0.0 - 0.1 K/UL    ABS. IMM.  GRANS. 0.0 0.00 - 0.04 K/UL    DF AUTOMATED     METABOLIC PANEL, COMPREHENSIVE   Result Value Ref Range    Sodium 138 136 - 145 mmol/L    Potassium 2.7 (LL) 3.5 - 5.1 mmol/L    Chloride 106 97 - 108 mmol/L    CO2 25 21 - 32 mmol/L    Anion gap 7 5 - 15 mmol/L    Glucose 182 (H) 65 - 100 mg/dL    BUN 9 6 - 20 mg/dL    Creatinine 1.27 0.70 - 1.30 mg/dL    BUN/Creatinine ratio 7 (L) 12 - 20      GFR est AA >60 >60 ml/min/1.73m2    GFR est non-AA >60 >60 ml/min/1.73m2    Calcium 8.8 8.5 - 10.1 mg/dL    Bilirubin, total 0.7 0.2 - 1.0 mg/dL    AST (SGOT) 17 15 - 37 U/L    ALT (SGPT) 33 12 - 78 U/L    Alk. phosphatase 59 45 - 117 U/L    Protein, total 8.2 6.4 - 8.2 g/dL    Albumin 4.3 3.5 - 5.0 g/dL    Globulin 3.9 2.0 - 4.0 g/dL    A-G Ratio 1.1 1.1 - 2.2     DRUG SCREEN, URINE   Result Value Ref Range    AMPHETAMINES Negative Negative      BARBITURATES Negative Negative      BENZODIAZEPINES Negative Negative      COCAINE Negative Negative      METHADONE Negative Negative      OPIATES Negative Negative      PCP(PHENCYCLIDINE) Negative Negative      THC (TH-CANNABINOL) Positive (A) Negative      Drug screen comment        This test is a screen for drugs of abuse in a medical setting only (i.e., they are unconfirmed results and as such must not be used for non-medical purposes, e.g.,employment testing, legal testing). Due to its inherent nature, false positive (FP) and false negative (FN) results may be obtained. Therefore, if necessary for medical care, recommend confirmation of positive findings by GC/MS.    URINALYSIS W/MICROSCOPIC   Result Value Ref Range    Color Yellow/Straw      Appearance Clear Clear      Specific gravity 1.024 1.003 - 1.030      pH (UA) 5.0 5.0 - 8.0      Protein 30 (A) Negative mg/dL    Glucose Negative Negative mg/dL    Ketone Negative Negative mg/dL    Bilirubin Negative Negative      Blood Negative Negative      Urobilinogen 0.1 0.1 - 1.0 EU/dL    Nitrites Negative Negative      Leukocyte Esterase Negative Negative      WBC 0-5 0 - 4 /hpf    RBC 0-5 0 - 5 /hpf    Bacteria Negative Negative /hpf    Mucus Trace /lpf   ETHYL ALCOHOL   Result Value Ref Range    ALCOHOL(ETHYL),SERUM <4 <10 mg/dL   SARS-COV-2   Result Value Ref Range    SARS-CoV-2 Nasopharyngeal     GLUCOSE, POC   Result Value Ref Range    Glucose (POC) 86 65 - 100 mg/dL    Performed by TWYLA BERRY    EKG, 12 LEAD, INITIAL   Result Value Ref Range    Ventricular Rate 100 BPM    Atrial Rate 100 BPM    P-R Interval 152 ms    QRS Duration 86 ms    Q-T Interval 328 ms    QTC Calculation (Bezet) 423 ms    Calculated P Axis 81 degrees    Calculated R Axis 58 degrees    Calculated T Axis 76 degrees    Diagnosis       Normal sinus rhythm  Right atrial enlargement  Moderate voltage criteria for LVH, may be normal variant  Borderline ECG  No previous ECGs available  Confirmed by Suzette Franco ([id number]) on 2/27/2021 4:29:50 PM         Immunizations administered during this encounter: There is no immunization history for the selected administration types on file for this patient. Screening for Metabolic Disorders for Patients on Antipsychotic Medications  (Data obtained from the EMR)    Estimated Body Mass Index  Estimated body mass index is 22.96 kg/m² as calculated from the following:    Height as of this encounter: 5' 10\" (1.778 m). Weight as of this encounter: 72.6 kg (160 lb). Vital Signs/Blood Pressure  Visit Vitals  /64   Pulse 78   Temp 97.5 °F (36.4 °C)   Resp 18   Ht 5' 10\" (1.778 m)   Wt 72.6 kg (160 lb)   SpO2 99%   BMI 22.96 kg/m²       Blood Glucose/Hemoglobin A1c  Lab Results   Component Value Date/Time    Glucose 182 (H) 02/22/2021 02:15 AM    Glucose (POC) 86 02/22/2021 12:23 PM       No results found for: HBA1C, HGBE8, HEE2UNJP     Lipid Panel  No results found for: CHOL, CHOLX, CHLST, CHOLV, 059580, HDL, HDLP, LDL, LDLC, DLDLP, TGLX, TRIGL, TRIGP, CHHD, CHHDX     Discharge Diagnosis:  Klaudia ([address] [F30.9]    Discharge Plan: Patient will return home with father and f/u with medication management at [facility], as well as counseling. Pt requested Buddhism counseling, and all nearby offices are not open until Monday. Pt provided with resources to contact Monday. Pt also provided D19 Same Day Access contact information.      Discharge Medication List and Instructions:   Current Discharge Medication List      START taking these medications    Details   divalproex DR (DEPAKOTE) 125 mg tablet Take 1 Tab by mouth two (2) times a day. Qty: 60 Tab, Refills: 0      QUEtiapine (SEROquel) 200 mg tablet Take 1 Tab by mouth nightly. Indications: repeated episodes of anxiety  Qty: 30 Tab, Refills: 0      traZODone (DESYREL) 50 mg tablet Take 1 Tab by mouth nightly as needed for Sleep. Qty: 30 Tab, Refills: 0             Unresulted Labs (24h ago, onward)    None        To obtain results of studies pending at discharge, please contact [phone]    Follow-up Information     Follow up With Specialties Details Why Contact Info    None    None (395) Patient stated that they have no PCP      [address] [place], [place] [address]S [facility] have an in-office appointment on 3/24/21 at 2:45. Please make sure to bring your insurance card, and photo ID. All other appointments will be via telehealth. Earnest Arnold [facility], [address] [facility]            Advanced Directive:   Does the patient have an appointed surrogate decision maker? No  Does the patient have a Medical Advance Directive? No  Does the patient have a Psychiatric Advance Directive? No  If the patient does not have a surrogate or Medical Advance Directive AND Psychiatric Advance Directive, the patient was offered information on these advance directives Patient declined to complete    Patient Instructions: Please continue all medications until otherwise directed by physician. Tobacco Cessation Discharge Plan:   Is the patient a smoker and needs referral for smoking cessation? No  Patient referred to the following for smoking cessation with an appointment? Not applicable     Patient was offered medication to assist with smoking cessation at discharge? Not applicable  Was education for smoking cessation added to the discharge instructions?  Not applicable    Alcohol/Substance Abuse Discharge Plan:   Does the patient have a history of substance/alcohol abuse and requires a referral for treatment? Yes  Patient referred to the following for substance/alcohol abuse treatment with an appointment? Refused  Patient was offered medication to assist with alcohol cessation at discharge? Refused  Was education for substance/alcohol abuse added to discharge instructions?  Yes    Patient discharged to Home; discussed with patient/caregiver

## 2021-03-05 NOTE — SUICIDE SAFETY PLAN
SAFETY PLAN    A suicide Safety Plan is a document that supports someone when they are having thoughts of suicide. Warning Signs that indicate a suicidal crisis may be developing: What (situations, thoughts, feelings, body sensations, behaviors, etc.) do you experience that lets you know you are beginning to think about suicide? 1. bad  2. hot  3. played    Internal Coping Strategies:  What things can I do (relaxation techniques, hobbies, physical activities, etc.) to take my mind off my problems without contacting another person? 1. Writing in my journal, reading  2. basketball  3. food    People and social settings that provide distraction: Who can I call or where can I go to distract me? 1. Name: Jose Rehman (son)  Phone: [employer] in phone  2. Name: dad  Phone: [phone]   3. Place: outside            4. Place: go driving    People whom I can ask for help: Who can I call when I need help - for example, friends, family, clergy, someone else? 1. Name: mom                Phone: [phone]  2. Name: [place] [address]  Phone: [phone]  3. Name: dad  Phone: [phone]    Professionals or [address] [place] [address] [address] I can contact during a crisis: Who can I call for help - for example, my doctor, my psychiatrist, my psychologist, a mental health provider, a suicide hotline? 1. Clinician Name: AMAN ROA [facility]   Phone: [phone]      Clinician Pager or Emergency Contact #:     2. Clinician Name: Ignacio Gregory   Phone: [phone]      Clinician Pager or Emergency Contact #:     3. Suicide Prevention Lifeline: 7-788-861-TALK (6171)    4. [address] [place] [address] Emergency Services -  for example, [facility] suicide hotline, [facility] Hotline: [facility]      Emergency Services Address:       Emergency Services Phone: [phone]    Making the environment safe: How can I make my environment (house/apartment/living space) safer?  For example, can I remove guns, medications, and other items? 1. Take my medications  2.  Keep knife stored away

## 2021-03-05 NOTE — GROUP NOTE
IP  GROUP DOCUMENTATION INDIVIDUAL Group Therapy Note Date: 3/5/2021 Group Start Time: 1400 Group End Time: 1500 Group Topic: Special Track - Drug Topic SRM 2 BEHA HLTH ACUTE Rodrigo Baumann, RT 
 
[facility] GROUP DOCUMENTATION GROUP Group Therapy Note Recovery & Relapse Attendees: 8 Attendance: Attended Patient's Goal:  To attend groups Interventions/techniques: Informed Follows Directions: Followed directions Interactions: Interacted appropriately Mental Status: Calm Behavior/appearance: Withdrawn/quiet Goals Achieved: Able to listen to others and Able to receive feedback Additional Notes:  Pt attended group and seem distracted and not able to stay focused.  
 
Hanna Duncan, RT

## 2021-03-05 NOTE — BH NOTES
Dx: Depression w/SI    Affect restricted and calm, with underline anger. Cont to say he does not feel he needs to be in the hospital. Pt is scheduled to be d/c tomorrow. Compliant with scheduled med. No eye to eye contact. Appears clean in appearance; however, has not showered this shift. Consumed 3/4 of meals. Encouraged to attend groups. Q 15 mins checks maintained, for safety. Attended some groups. No attempts to leave. No attempts to self harm.

## 2021-03-05 NOTE — BH NOTES
Behavioral Health Treatment Team Note     Patient goal(s) for today: 'go to groups and stay focused on treatment'  Treatment team focus/goals: continue medication management, group therapy and coordinate follow up services prior to discharge    Progress note: Pt presented with a broader affect, soft spoken and organised speech and thought. Pt said he is looking forward to going home tomorrow and doesn't want 'to have to come back here'. Writer validated pts concerns and discussed setting him up with a therapist so that he could continue to work on himself and have someone to process with. Pt said he is very involved in his [place] and asked if he could get a [place] based counselor and writer said that it is definitely possible and would let the  know. Pt spoke about how 'people make bad choices on purpose' and that 'he always tries to do well'. Pt and writer spoke about only being in control of ourselves and setting boundaries which pt was receptive to. Pt is looking forward to hopefully returning home tomorrow    LOS:  11  Expected LOS: 12 days    Insurance info/prescription coverage:  Medicaid  Date of last family contact:  3.4.21  Family requesting physician contact today:  no  Discharge plan:  Pt to return home with outpatient services coordinated  Guns in the home:  no   Outpatient provider(s):   Will be coordinated prior to discharge    Participating treatment team members: Arian Calderon, * (assigned SW), JERRY

## 2021-03-05 NOTE — GROUP NOTE
[facility] GROUP DOCUMENTATION INDIVIDUAL Group Therapy Note Date: 3/5/2021 Group Start Time: 1100 Group End Time: 8973 Group Topic: Education Group - Inpatient [facility] Group Ana Laura Davis [facility] GROUP DOCUMENTATION GROUP Group Therapy Note Attendees: All pts were encouraged to attend but only 9 out of 12 attended. The topic today was emotions. The pts were asked to introduce themselves and then to state how they were currently feeling. The pts were then asked if they could relate to any of the emotions listed on the sheet either throughout their lives, when they came to [facility], or currently. The pts were then asked to reflect on how group went and if it was helpful. Attendance: Attended Patient's Goal:  To attend groups and activities Interventions/techniques: Informed, Validated, Promoted peer support, Provide feedback and Supported Follows Directions: Followed directions Interactions: Interacted appropriately Mental Status: Calm and Congruent Behavior/appearance: Attentive and Cooperative Goals Achieved: Able to engage in interactions, Able to listen to others, Able to reflect/comment on own behavior, Able to self-disclose, Identified feelings, Increased hopefulness and Verbalized increased hopefulness Additional Notes:  Pt did not participate much. He reported feeling happy bc he is discharging tomorrow. He disclosed that he was angry a week or so ago when he thought he was going home and was told differently. He reported drawing, journaling, reading, and working out to cope with it. Kenya Berrios

## 2021-03-05 NOTE — GROUP NOTE
IP  GROUP DOCUMENTATION INDIVIDUAL Group Therapy Note Date: 3/5/2021 Group Start Time: 1806 Group End Time: 3725 Group Topic: Special Track - Drug Topic SRM 2 [facility] ACUTE Rosezetta Come, RT 
 
IP  GROUP DOCUMENTATION GROUP Group Therapy Note Triggers Attendees: 5 Attendance: Did not attend Additional Notes:  Pt was invited to attend group but did not attend Enzo Lizarraga RT

## 2021-03-05 NOTE — BH NOTES
Patient has been visible on the unit, in the dayroom for short interval. Socializing with elective peers. Affect is flat. He is alert & oriented X 4 Patient denies depression, SI, HI, AH & VH. Patient stated that his anxiety was 10/10. He has thr  jiggered. He was giving PRN for relief. He is compliant with medication. No acute distress noted. He on close observation for safety.

## 2021-03-05 NOTE — BH NOTES
DISCHARGE SUMMARY    NAME:Arian Calderon  : 1995  MRN: [id number]    The patient Juan Calderon exhibits the ability to control behavior in a less restrictive environment. Patient's level of functioning is improving. No assaultive/destructive behavior has been observed for the past 24 hours. No suicidal/homicidal threat or behavior has been observed for the past 24 hours. There is no evidence of serious medication side effects. Patient has not been in physical or protective restraints for at least the past 24 hours. If weapons involved, how are they secured? N/A    Is patient aware of and in agreement with discharge plan? yes    Arrangements for medication:  Prescriptions given to patient, given a weeks supply or 30 day supply. Copy of discharge instructions to provider?:  yes    Arrangements for transportation home:  yes    Keep all follow up appointments as scheduled, continue to take prescribed medications per physician instructions.   Mental health crisis number:  405 or your local mental health crisis line number at [address] [facility] Emergency WARM LINE      1-746-707-MHAV (1500)      M-F: 9am to 9pm      Sat & Sun: 5pm  9pm  National suicide prevention lines:                             3-313-HZSBTCS (4-128-905-448-911-7387)       1-325-221-TALK ([phone])    Crisis Text Line:  Text HOME to 067319

## 2021-03-06 VITALS
TEMPERATURE: 97.9 F | HEIGHT: 70 IN | OXYGEN SATURATION: 98 % | RESPIRATION RATE: 18 BRPM | SYSTOLIC BLOOD PRESSURE: 103 MMHG | HEART RATE: 87 BPM | BODY MASS INDEX: 22.9 KG/M2 | WEIGHT: 160 LBS | DIASTOLIC BLOOD PRESSURE: 69 MMHG

## 2021-03-06 PROCEDURE — 74011250637 HC RX REV CODE- 250/637: Performed by: PSYCHIATRY & NEUROLOGY

## 2021-03-06 RX ADMIN — DIVALPROEX SODIUM 125 MG: 125 TABLET, DELAYED RELEASE ORAL at 08:48

## 2021-03-06 NOTE — GROUP NOTE
IP  GROUP DOCUMENTATION INDIVIDUAL Group Therapy Note Date: 3/6/2021 Group Start Time: 9671 Group End Time: 0018 Group Topic: Process Group - Inpatient SRM 2  NON ACUTE Roscoe Eduardo [facility] GROUP DOCUMENTATION GROUP Group Therapy Note  facilitated a process group geared towards the following topics: substance abuse, mental health recovery, relapse, and healthy coping mechanisms. This writer encouraged each patient to verbalize ways they can improve their mental health after discharge. Attendees: 4 out of 9 Attendance: Did not attend Patient's Goal:  N/A Interventions/techniques: N/A Follows Directions: N/A Interactions: N/A Mental Status: N/A Behavior/appearance: N/A Goals Achieved: N/A Additional Notes:  Patient refused to attend group despite encouragement. Steffany Prieto

## 2021-03-06 NOTE — BH NOTES
Patient case discussed in the treatment team and patient cooperative following the expectations and no [place] no psychosis no acting out not suicidal not homicidal not paranoid compliant with the medication he did take long-acting Depo injection no side effects and the  work with the family mom and that they feel comfortable having for discharge him tomorrow anticipated discharge tomorrow unless some significant change in behavior patient is thankful polite cooperative and anticipated discharge tomorrow taking Depakote 125 mg twice a day and also Seroquel 200 mg at night at 2 attending the groups interactive with the peers appropriately

## 2021-03-06 NOTE — BH NOTES
Social work note: This writer contacted the patient's father, Abby Lima #[phone], to discuss discharge plans and to arrange transportation for today. Patient's father stated he is unable to pick his son up because he is at work. He provided the mother's contact information. He stated his mother should be able to . This writer will call the mother to verify transportation.

## 2021-03-06 NOTE — GROUP NOTE
[facility] GROUP DOCUMENTATION INDIVIDUAL Group Therapy Note Date: 3/6/2021 Group Start Time: 1300 Group End Time: 1400 Group Topic: Education Group - Inpatient SRM 2  NON ACUTE Pietro Anaya [facility] GROUP DOCUMENTATION GROUP Group Therapy Note Attendees: 5 Introduced A.N.T.S (automatic negative thinking) and ways to challenge negative thoughts as positive way of coping. Attendance: Attended Patient's Goal:  STG: Attends activities and groups Interventions/techniques: Informed and Provide feedback Follows Directions: Followed directions Interactions: Other interacted socially while in group Mental Status: Calm and Flat Behavior/appearance: Cooperative Goals Achieved: Able to engage in interactions Additional Notes: Initially attended group and verbalized he was waiting for discharge. Pt interacted with peers and said his \"goodbyes\" to peers. Left group and did not return.   
 
Rhodia Apley, CTRS

## 2021-03-06 NOTE — BH NOTES
Social work note: This writer contacted the patient's mother, Heron Hoffman #[phone], to verify that she can  patient from the hospital. Mother stated she can  the patient but was unaware of his discharge plans. She also requested information about his injection. This writer informed her that he received his injection on 03/03/2021. She reported dissatisfaction with the discharge process here at the hospital and stated that someone should have contacted the family before his discharge date to discuss discharge plans. This writer provided the mother with discharge information.

## 2021-03-06 NOTE — BH NOTES
Client is pleasant and cooperative. Alert and oriented x 3. Appearance is neat and clean. He has a good appetite and reports sleeping well. He is excited about going home today. Client agreed to take meds as prescribed. Client given discharge information and he verbalized understanding discharge plans. Client escorted to the lobby for mom to .

## 2021-03-06 NOTE — BH NOTES
Nursing Note    Patient is alert and oriented x 4. He denies any SI/HI/AH/VH. Patient denies any anxiety or depression. Restricted affect and calm mood. Patient seen socializing with peers in dayroom. He states that he is ready for discharge. Patient's parents state that the  Mark Serraot has not contacted them and approved for his return home. Arian's parents state that they are afraid of him and state that they do not want him return at this time because they feel that he will only return if not worst.  The parents have contacted a patient advocate in order to avoid discharge. Staff will continue to monitor patient for safety.
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
## 2022-03-18 PROBLEM — F30.9 MANIA (HCC): Status: ACTIVE | Noted: 2021-02-22

## 2023-05-14 RX ORDER — TRAZODONE HYDROCHLORIDE 50 MG/1
50 TABLET ORAL
COMMUNITY
Start: 2021-03-05

## 2023-05-14 RX ORDER — QUETIAPINE FUMARATE 200 MG/1
200 TABLET, FILM COATED ORAL
COMMUNITY
Start: 2021-03-05

## 2023-05-14 RX ORDER — DIVALPROEX SODIUM 125 MG/1
125 TABLET, DELAYED RELEASE ORAL 2 TIMES DAILY
COMMUNITY
Start: 2021-03-05

## 2023-08-01 ENCOUNTER — HOSPITAL ENCOUNTER (EMERGENCY)
Facility: HOSPITAL | Age: 28
Discharge: HOME | End: 2023-08-01
Attending: EMERGENCY MEDICINE

## 2023-08-01 VITALS
WEIGHT: 150 LBS | RESPIRATION RATE: 18 BRPM | DIASTOLIC BLOOD PRESSURE: 76 MMHG | TEMPERATURE: 98 F | HEART RATE: 72 BPM | HEIGHT: 70 IN | OXYGEN SATURATION: 95 % | BODY MASS INDEX: 21.47 KG/M2 | SYSTOLIC BLOOD PRESSURE: 96 MMHG

## 2023-08-01 DIAGNOSIS — Z01.20 ENCOUNTER FOR DENTAL EXAMINATION: Primary | ICD-10-CM

## 2023-08-01 DIAGNOSIS — Z91.89 AT RISK FOR DENTAL PROBLEMS: ICD-10-CM

## 2023-08-01 PROCEDURE — 99283 EMERGENCY DEPT VISIT LOW MDM: CPT

## 2023-08-01 ASSESSMENT — PAIN - FUNCTIONAL ASSESSMENT: PAIN_FUNCTIONAL_ASSESSMENT: NONE - DENIES PAIN

## 2023-08-01 NOTE — ED PROVIDER NOTES
the computer software. Please disregards these errors.  Please excuse any errors that have escaped final proofreading.)     Chandler May MD  08/01/23 0974

## 2023-08-01 NOTE — DISCHARGE INSTRUCTIONS
Dental resources:    Emergency [address] [facility] [address]   [address], [place], [address] [facility]   Monday, Wednesday, Friday: 8am-5pm   Tuesday and Thursday: 8am-6pm   Phone: [phone]   $70 for Emergency Care   $60 for first routine care, then pay by sliding scale based upon income     [facility]   [address], [address] [place]   Phone: [phone], select option (2) to confirm time for treatment     [facility]   [address], [address]   Monday-Friday: 8am-4pm   Phone: [phone] of Dentistry Urgent [address] [place] [address], [address] [place] [address], [address] [place] [address]   Phone: [phone] to confirm a time for emergency treatment   Pediatrics: [phone]   $75 per tooth, extractions only     [facility]   [address], [address]  [place] [address]   Phone [phone] or [phone]   Hours [id number] (extractions)   Simple tooth extraction: $60 per tooth, $55 per x-ray     [facility] (in [place])   [facility] [place] only   Phone: [phone], leave message saying you need an appointment to register   Hours: Wed 6-9p       Non-Urgent Levon AGUILA [address] [facility] [address] [facility]  [address], [place], [address]   Dental Clinic: [phone]   Oral Surgery Clinic: [phone]